# Patient Record
Sex: FEMALE | Race: WHITE | NOT HISPANIC OR LATINO | URBAN - METROPOLITAN AREA
[De-identification: names, ages, dates, MRNs, and addresses within clinical notes are randomized per-mention and may not be internally consistent; named-entity substitution may affect disease eponyms.]

---

## 2023-05-10 ENCOUNTER — INPATIENT (INPATIENT)
Facility: HOSPITAL | Age: 23
LOS: 1 days | Discharge: HOME | DRG: 560 | End: 2023-05-12
Attending: OBSTETRICS & GYNECOLOGY | Admitting: OBSTETRICS & GYNECOLOGY
Payer: MEDICAID

## 2023-05-10 VITALS
RESPIRATION RATE: 16 BRPM | SYSTOLIC BLOOD PRESSURE: 116 MMHG | DIASTOLIC BLOOD PRESSURE: 73 MMHG | TEMPERATURE: 98 F | HEART RATE: 82 BPM

## 2023-05-10 DIAGNOSIS — O26.893 OTHER SPECIFIED PREGNANCY RELATED CONDITIONS, THIRD TRIMESTER: ICD-10-CM

## 2023-05-10 LAB
ABO RH CONFIRMATION: SIGNIFICANT CHANGE UP
BASOPHILS # BLD AUTO: 0.06 K/UL — SIGNIFICANT CHANGE UP (ref 0–0.2)
BASOPHILS NFR BLD AUTO: 0.3 % — SIGNIFICANT CHANGE UP (ref 0–1)
BLD GP AB SCN SERPL QL: SIGNIFICANT CHANGE UP
EOSINOPHIL # BLD AUTO: 0.12 K/UL — SIGNIFICANT CHANGE UP (ref 0–0.7)
EOSINOPHIL NFR BLD AUTO: 0.7 % — SIGNIFICANT CHANGE UP (ref 0–8)
HBV SURFACE AG SERPL QL IA: SIGNIFICANT CHANGE UP
HCT VFR BLD CALC: 37.6 % — SIGNIFICANT CHANGE UP (ref 37–47)
HGB BLD-MCNC: 13.1 G/DL — SIGNIFICANT CHANGE UP (ref 12–16)
HIV 1 & 2 AB SERPL IA.RAPID: SIGNIFICANT CHANGE UP
IMM GRANULOCYTES NFR BLD AUTO: 0.6 % — HIGH (ref 0.1–0.3)
LYMPHOCYTES # BLD AUTO: 20.7 % — SIGNIFICANT CHANGE UP (ref 20.5–51.1)
LYMPHOCYTES # BLD AUTO: 3.68 K/UL — HIGH (ref 1.2–3.4)
MCHC RBC-ENTMCNC: 30.9 PG — SIGNIFICANT CHANGE UP (ref 27–31)
MCHC RBC-ENTMCNC: 34.8 G/DL — SIGNIFICANT CHANGE UP (ref 32–37)
MCV RBC AUTO: 88.7 FL — SIGNIFICANT CHANGE UP (ref 81–99)
MEV IGG SER-ACNC: >300 AU/ML — SIGNIFICANT CHANGE UP
MEV IGG+IGM SER-IMP: POSITIVE — SIGNIFICANT CHANGE UP
MONOCYTES # BLD AUTO: 1.17 K/UL — HIGH (ref 0.1–0.6)
MONOCYTES NFR BLD AUTO: 6.6 % — SIGNIFICANT CHANGE UP (ref 1.7–9.3)
MUV AB SER-ACNC: POSITIVE — SIGNIFICANT CHANGE UP
MUV IGG FLD-ACNC: 148 AU/ML — SIGNIFICANT CHANGE UP
NEUTROPHILS # BLD AUTO: 12.66 K/UL — HIGH (ref 1.4–6.5)
NEUTROPHILS NFR BLD AUTO: 71.1 % — SIGNIFICANT CHANGE UP (ref 42.2–75.2)
NRBC # BLD: 0 /100 WBCS — SIGNIFICANT CHANGE UP (ref 0–0)
PLATELET # BLD AUTO: 240 K/UL — SIGNIFICANT CHANGE UP (ref 130–400)
PMV BLD: 12.7 FL — HIGH (ref 7.4–10.4)
PRENATAL SYPHILIS TEST: SIGNIFICANT CHANGE UP
RBC # BLD: 4.24 M/UL — SIGNIFICANT CHANGE UP (ref 4.2–5.4)
RBC # FLD: 12.9 % — SIGNIFICANT CHANGE UP (ref 11.5–14.5)
RUBV IGG SER-ACNC: 1.5 INDEX — SIGNIFICANT CHANGE UP
RUBV IGG SER-IMP: POSITIVE — SIGNIFICANT CHANGE UP
WBC # BLD: 17.8 K/UL — HIGH (ref 4.8–10.8)
WBC # FLD AUTO: 17.8 K/UL — HIGH (ref 4.8–10.8)

## 2023-05-10 PROCEDURE — 86901 BLOOD TYPING SEROLOGIC RH(D): CPT

## 2023-05-10 PROCEDURE — 87340 HEPATITIS B SURFACE AG IA: CPT

## 2023-05-10 PROCEDURE — 99214 OFFICE O/P EST MOD 30 MIN: CPT

## 2023-05-10 PROCEDURE — 86762 RUBELLA ANTIBODY: CPT

## 2023-05-10 PROCEDURE — 86765 RUBEOLA ANTIBODY: CPT

## 2023-05-10 PROCEDURE — 59409 OBSTETRICAL CARE: CPT | Mod: U9

## 2023-05-10 PROCEDURE — 85025 COMPLETE CBC W/AUTO DIFF WBC: CPT

## 2023-05-10 PROCEDURE — 86592 SYPHILIS TEST NON-TREP QUAL: CPT

## 2023-05-10 PROCEDURE — 86703 HIV-1/HIV-2 1 RESULT ANTBDY: CPT

## 2023-05-10 PROCEDURE — 86850 RBC ANTIBODY SCREEN: CPT

## 2023-05-10 PROCEDURE — 36415 COLL VENOUS BLD VENIPUNCTURE: CPT

## 2023-05-10 PROCEDURE — 86735 MUMPS ANTIBODY: CPT

## 2023-05-10 PROCEDURE — 59050 FETAL MONITOR W/REPORT: CPT

## 2023-05-10 PROCEDURE — 86900 BLOOD TYPING SEROLOGIC ABO: CPT

## 2023-05-10 RX ORDER — SIMETHICONE 80 MG/1
80 TABLET, CHEWABLE ORAL EVERY 4 HOURS
Refills: 0 | Status: DISCONTINUED | OUTPATIENT
Start: 2023-05-10 | End: 2023-05-12

## 2023-05-10 RX ORDER — DIBUCAINE 1 %
1 OINTMENT (GRAM) RECTAL EVERY 6 HOURS
Refills: 0 | Status: DISCONTINUED | OUTPATIENT
Start: 2023-05-10 | End: 2023-05-12

## 2023-05-10 RX ORDER — MAGNESIUM HYDROXIDE 400 MG/1
30 TABLET, CHEWABLE ORAL
Refills: 0 | Status: DISCONTINUED | OUTPATIENT
Start: 2023-05-10 | End: 2023-05-12

## 2023-05-10 RX ORDER — DEXAMETHASONE 0.5 MG/5ML
4 ELIXIR ORAL EVERY 6 HOURS
Refills: 0 | Status: DISCONTINUED | OUTPATIENT
Start: 2023-05-10 | End: 2023-05-12

## 2023-05-10 RX ORDER — ONDANSETRON 8 MG/1
4 TABLET, FILM COATED ORAL EVERY 6 HOURS
Refills: 0 | Status: DISCONTINUED | OUTPATIENT
Start: 2023-05-10 | End: 2023-05-12

## 2023-05-10 RX ORDER — OXYCODONE HYDROCHLORIDE 5 MG/1
5 TABLET ORAL
Refills: 0 | Status: DISCONTINUED | OUTPATIENT
Start: 2023-05-10 | End: 2023-05-12

## 2023-05-10 RX ORDER — BENZOCAINE 10 %
1 GEL (GRAM) MUCOUS MEMBRANE EVERY 6 HOURS
Refills: 0 | Status: DISCONTINUED | OUTPATIENT
Start: 2023-05-10 | End: 2023-05-12

## 2023-05-10 RX ORDER — LANOLIN
1 OINTMENT (GRAM) TOPICAL EVERY 6 HOURS
Refills: 0 | Status: DISCONTINUED | OUTPATIENT
Start: 2023-05-10 | End: 2023-05-12

## 2023-05-10 RX ORDER — AER TRAVELER 0.5 G/1
1 SOLUTION RECTAL; TOPICAL EVERY 4 HOURS
Refills: 0 | Status: DISCONTINUED | OUTPATIENT
Start: 2023-05-10 | End: 2023-05-12

## 2023-05-10 RX ORDER — FENTANYL/BUPIVACAINE/NS/PF 2MCG/ML-.1
250 PLASTIC BAG, INJECTION (ML) INJECTION
Refills: 0 | Status: DISCONTINUED | OUTPATIENT
Start: 2023-05-10 | End: 2023-05-12

## 2023-05-10 RX ORDER — KETOROLAC TROMETHAMINE 30 MG/ML
30 SYRINGE (ML) INJECTION ONCE
Refills: 0 | Status: DISCONTINUED | OUTPATIENT
Start: 2023-05-10 | End: 2023-05-10

## 2023-05-10 RX ORDER — PRAMOXINE HYDROCHLORIDE 150 MG/15G
1 AEROSOL, FOAM RECTAL EVERY 4 HOURS
Refills: 0 | Status: DISCONTINUED | OUTPATIENT
Start: 2023-05-10 | End: 2023-05-12

## 2023-05-10 RX ORDER — SODIUM CHLORIDE 9 MG/ML
1000 INJECTION, SOLUTION INTRAVENOUS
Refills: 0 | Status: DISCONTINUED | OUTPATIENT
Start: 2023-05-10 | End: 2023-05-10

## 2023-05-10 RX ORDER — DIPHENHYDRAMINE HCL 50 MG
25 CAPSULE ORAL EVERY 6 HOURS
Refills: 0 | Status: DISCONTINUED | OUTPATIENT
Start: 2023-05-10 | End: 2023-05-12

## 2023-05-10 RX ORDER — NALOXONE HYDROCHLORIDE 4 MG/.1ML
0.1 SPRAY NASAL
Refills: 0 | Status: DISCONTINUED | OUTPATIENT
Start: 2023-05-10 | End: 2023-05-12

## 2023-05-10 RX ORDER — CITRIC ACID/SODIUM CITRATE 300-500 MG
15 SOLUTION, ORAL ORAL EVERY 6 HOURS
Refills: 0 | Status: DISCONTINUED | OUTPATIENT
Start: 2023-05-10 | End: 2023-05-10

## 2023-05-10 RX ORDER — OXYCODONE HYDROCHLORIDE 5 MG/1
5 TABLET ORAL ONCE
Refills: 0 | Status: DISCONTINUED | OUTPATIENT
Start: 2023-05-10 | End: 2023-05-12

## 2023-05-10 RX ORDER — TETANUS TOXOID, REDUCED DIPHTHERIA TOXOID AND ACELLULAR PERTUSSIS VACCINE, ADSORBED 5; 2.5; 8; 8; 2.5 [IU]/.5ML; [IU]/.5ML; UG/.5ML; UG/.5ML; UG/.5ML
0.5 SUSPENSION INTRAMUSCULAR ONCE
Refills: 0 | Status: DISCONTINUED | OUTPATIENT
Start: 2023-05-10 | End: 2023-05-12

## 2023-05-10 RX ORDER — HYDROCORTISONE 1 %
1 OINTMENT (GRAM) TOPICAL EVERY 6 HOURS
Refills: 0 | Status: DISCONTINUED | OUTPATIENT
Start: 2023-05-10 | End: 2023-05-12

## 2023-05-10 RX ORDER — OXYTOCIN 10 UNIT/ML
333.33 VIAL (ML) INJECTION
Qty: 20 | Refills: 0 | Status: DISCONTINUED | OUTPATIENT
Start: 2023-05-10 | End: 2023-05-12

## 2023-05-10 RX ORDER — SODIUM CHLORIDE 9 MG/ML
3 INJECTION INTRAMUSCULAR; INTRAVENOUS; SUBCUTANEOUS EVERY 8 HOURS
Refills: 0 | Status: DISCONTINUED | OUTPATIENT
Start: 2023-05-10 | End: 2023-05-12

## 2023-05-10 RX ORDER — CHLORHEXIDINE GLUCONATE 213 G/1000ML
1 SOLUTION TOPICAL DAILY
Refills: 0 | Status: DISCONTINUED | OUTPATIENT
Start: 2023-05-10 | End: 2023-05-10

## 2023-05-10 RX ORDER — ACETAMINOPHEN 500 MG
975 TABLET ORAL
Refills: 0 | Status: DISCONTINUED | OUTPATIENT
Start: 2023-05-10 | End: 2023-05-12

## 2023-05-10 RX ORDER — IBUPROFEN 200 MG
600 TABLET ORAL EVERY 6 HOURS
Refills: 0 | Status: COMPLETED | OUTPATIENT
Start: 2023-05-10 | End: 2024-04-07

## 2023-05-10 RX ORDER — IBUPROFEN 200 MG
600 TABLET ORAL EVERY 6 HOURS
Refills: 0 | Status: DISCONTINUED | OUTPATIENT
Start: 2023-05-10 | End: 2023-05-12

## 2023-05-10 RX ADMIN — Medication 1 TABLET(S): at 11:42

## 2023-05-10 RX ADMIN — SODIUM CHLORIDE 3 MILLILITER(S): 9 INJECTION INTRAMUSCULAR; INTRAVENOUS; SUBCUTANEOUS at 16:00

## 2023-05-10 RX ADMIN — Medication 975 MILLIGRAM(S): at 12:15

## 2023-05-10 RX ADMIN — Medication 975 MILLIGRAM(S): at 18:25

## 2023-05-10 RX ADMIN — Medication 975 MILLIGRAM(S): at 19:00

## 2023-05-10 RX ADMIN — Medication 1000 MILLIUNIT(S)/MIN: at 08:13

## 2023-05-10 RX ADMIN — Medication 600 MILLIGRAM(S): at 15:23

## 2023-05-10 RX ADMIN — Medication 975 MILLIGRAM(S): at 11:42

## 2023-05-10 RX ADMIN — Medication 600 MILLIGRAM(S): at 16:00

## 2023-05-10 RX ADMIN — SODIUM CHLORIDE 3 MILLILITER(S): 9 INJECTION INTRAMUSCULAR; INTRAVENOUS; SUBCUTANEOUS at 21:37

## 2023-05-10 NOTE — OB PROVIDER H&P - NSLOWPPHRISK_OBGYN_A_OB
No previous uterine incision/Chery Pregnancy/Less than or equal to 4 previous vaginal births/No known bleeding disorder/No history of postpartum hemorrhage/No other PPH risks indicated

## 2023-05-10 NOTE — OB PROVIDER H&P - ASSESSMENT
23y , @40w2d, GBS neg, SROM since  @1100     -admit to labor and delivery  -pain management prn   -continous efm & toco  -admission labs  -IV access   -IV hydration   -diet: clear liquid diet     Dr. Gibson and Dr. Gutierrez aware.  23y , @40w2d, GBS neg, SROM since  @1100     -admit to labor and delivery  -pain management prn   -continous efm & toco  -admission labs  -IV access   -IV hydration   -diet: clear liquid diet     d/w Dr Gutierrez

## 2023-05-10 NOTE — OB PROVIDER DELIVERY SUMMARY - NSNUMBEROFNEWBORNS_OBGYN_ALL_OB_NU
Hub to read    Patient needs to be established with new provider before any medications are refilled. [facility] new office number is [phone]   1

## 2023-05-10 NOTE — OB RN DELIVERY SUMMARY - NSBEGANLABOR_OBGYN_A_OB
Prior to Admission Repair Performed By Another Provider Text (Leave Blank If You Do Not Want): After the tissue was excised the defect was repaired by another provider.

## 2023-05-10 NOTE — OB RN PATIENT PROFILE - WEIGHT METHOD
Prior to the start of the procedure and with procedural staff participation, I verbally confirmed the patient s identity using two indicators, relevant allergies, that the procedure was appropriate and matched the consent or emergent situation, and that the correct equipment/implants were available. Immediately prior to starting the procedure I conducted the Time Out with the procedural staff and re-confirmed the patient s name, procedure, and site/side. (The Joint Commission universal protocol was followed.)  Yes    Sedation (Moderate or Deep): None  Pt has signed the consent form stating that we will be doing a CYSTOSCOPY (with or without stent removal) today, and that it is the correct procedure. I verbally confirmed the patient s identity using two indicators, relevant allergies, and that the correct equipment was available. Post-op information given to the pt as needed at check-out. I have sent an appropriate antibiotic to the pharmacy in our building as recommended by the MD. ALISE Collazo CMA    
stated

## 2023-05-10 NOTE — OB RN DELIVERY SUMMARY - NSSELHIDDEN_OBGYN_ALL_OB_FT
[NS_DeliveryAttending1_OBGYN_ALL_OB_FT:[id number]],[NS_DeliveryRN_OBGYN_ALL_OB_FT:[id number]],[NS_CirculateRN2_OBGYN_ALL_OB_FT:[id number]=]

## 2023-05-10 NOTE — PRE-ANESTHESIA EVALUATION ADULT - RESPIRATORY RATE (BREATHS/MIN)
16
You can access the FollowMyHealth Patient Portal offered by [facility] by registering at the following website: [email or web address]. By joining [email or web address]’s FollowMyHealth portal, you will also be able to view your health information using other applications (apps) compatible with our system.

## 2023-05-10 NOTE — OB RN PATIENT PROFILE - DATE/TIME OF ACCEPTANCE
Patient presents for Zarxio injection today.     I am an RN. Does the patient have an active anti-cancer treatment plan? (oral, injection, or IV)   Yes.   Regimen: Taxotere/Cytoxan  Cycle/Day: cycle 2/day 4      ECOG:   ECOG [06/06/22 0267]   ECOG Performance Status 1       Oral Chemotherapy: No  Nursing Assessment:  A comprehensive nursing assessment was performed and the patient reports the following:    Anxiety/Depression/Insomnia: Anxiety: No  Pain: Generalized body aches from Zarxio. She has been taking Claritin and Tylenol with improvement.        Toxicity Assessment     Auditory/Ear  Assessment: Not Reviewed  Blood/Bone Marrow  Assessment: Not Reviewed  Cardiac General  Assessment: Yes (Within Defined Limits)  Constitutional  Assessment: Yes (Within Defined Limits)  Fatigue: Fatigue relieved by rest  Dermatology/Skin  Assessment: Yes (Within Defined Limits)  Endocrine  Assessment: Not Reviewed  Gastrointestinal  Assessment: Yes (Within Defined Limits)  Hemorrhage/Bleeding  Assessment: Yes (Within Defined Limits)  Infection  Assessment: Yes (Within Defined Limits)  Lymphatics  Assessment: Yes (Within Defined Limits)  Metabolic/Laboratory  Assessment: Not Reviewed  Musculoskeletal  Assessment: Yes (Within Defined Limits)  Generalized Muscle Weakness: Symptomatic, weakness perceived by patient but not evident on physical exam  Neurology  Assessment: Yes (Within Defined Limits)  Ocular  Assessment: Not Reviewed  Pain  Assessment: Yes (Within Defined Limits)  Pain: Mild pain  Pulmonary/Upper Respiratory  Assessment: Yes (Within Defined Limits)  Genitourinary  Assessment: Not Reviewed  Sexual/Reproductive  Assessment: Not Reviewed     Patient has valid pre-authorization, VS completed and Patient instructed to call the office with any questions or concerns    Pre-Injection Information: Allergies reviewed as required for injection type., Pt states feeling well, no complaints. and Pt denies signs and symptoms of  infection.    Refer to MAR (medication administration record) for type of injection and medication given.  Needle Size: kit  Patient tolerated well: Stable     Dr. Suarez is supervising clinician today.   10-May-2023 05:50

## 2023-05-10 NOTE — OB PROVIDER H&P - ATTENDING COMMENTS
23y , @40w2d, GBS neg, SROM since  @1100. regular ctx. Admit for labor/SROM and management   vtx, lepolds ~3300g, labs normal.  Maternal and fetal status reassuring   Labor management discussed with patient and may include augmentation with mechanical and medical options. IV insertion needed for resuscitative measures. Pain management by anesthesia available as needed. Possibility of  delivery in event of persistent nonreassuring fetal status remote from delivery discussed. Risk of hemorrhage and infection also discussed. All questions answered and patient endorsed understanding.

## 2023-05-10 NOTE — OB PROVIDER H&P - NSHPPHYSICALEXAM_GEN_ALL_CORE
Vital Signs Last 24 Hrs  T(C): 36.4 (10 May 2023 06:02), Max: 36.4 (10 May 2023 05:48)  T(F): 97.5 (10 May 2023 06:02), Max: 97.5 (10 May 2023 05:48)  HR: 82 (10 May 2023 06:02) (82 - 82)  BP: 116/73 (10 May 2023 06:02) (116/73 - 116/73)  RR: 16 (10 May 2023 06:02) (16 - 16)    Gen: AOx3, no acute distress  CVS: RRR  Lungs: CTABL  Abd: soft, gravid, non tender, mildly palpable contractions  Ext: No edema bilaterally    Speculum: Pooling amniotic fluid admix with slight blood, Nitrazine pos, ferning pos     EFM:  125/mod/+accels; cat 1  [place]: q2-3  SVE: 4/50/-2   vertex ruptured @0600

## 2023-05-10 NOTE — PROCEDURE NOTE - ADDITIONAL PROCEDURE DETAILS
0645 Called to patient room for epidural. Procedure reviewed, risks/benefits/alternatives discussed and consent obtained.  CINDY 4.5cm, 27Ga used to obtain CSF, 1.2ml Bupivacaine 0.25% administered with good effect  Catheter threaded easily to 11cm, negative aspiration, negative test dose  Patient placed on PIEB/PCEA instructed to request anesthesia with any questions/concerns  VSS/FHR WNL

## 2023-05-10 NOTE — OB PROVIDER H&P - HISTORY OF PRESENT ILLNESS
23y , @40w2d, BRIDGET 23 by LMP c/w first trimester sono, presenting for leakage of fluid and contractions. Reports leakage of fluid since yesterday,  @1100, admix with slight pink discharge. Contractions through the day yesterday and this morning, now occurring every 5 minutes.  Endorses good fetal movement. Denies complications this pregnancy. GBS neg.      Patient received prenatal care at [facility] in the [place], recently moved to [place].

## 2023-05-10 NOTE — OB PROVIDER DELIVERY SUMMARY - NSPROVIDERDELIVERYNOTE_OBGYN_ALL_OB_FT
Patient was fully dilated and pushing. Head delivered in OA and restituted to ROT. Unable to reduce nuchal cord. Anterior shoulder delivered followed by posterior shoulder atraumatically, then rest of the body. Nuchal cord reduced. The baby was suctioned and placed on mother's abdomen. Delayed cord clamping performed. Cord clamped and cut. Cord gases obtained. Placenta was spontaneously delivered intact. Fundus was massaged and firm. Good hemostasis was noted. Cervix and vagina were inspected, no lacerations appreciated, good hemostasis. Viable male delivered APGARS 9/9 weighing [id number].     Dr. Schulz and Dr. Gutierrez present for delivery.

## 2023-05-10 NOTE — OB PROVIDER DELIVERY SUMMARY - NSSELHIDDEN_OBGYN_ALL_OB_FT
[NS_DeliveryAttending1_OBGYN_ALL_OB_FT:[id number]],[NS_DeliveryRN_OBGYN_ALL_OB_FT:[id number]],[NS_CirculateRN2_OBGYN_ALL_OB_FT:[id number]=]

## 2023-05-11 ENCOUNTER — TRANSCRIPTION ENCOUNTER (OUTPATIENT)
Age: 23
End: 2023-05-11

## 2023-05-11 LAB
BASOPHILS # BLD AUTO: 0.05 K/UL — SIGNIFICANT CHANGE UP (ref 0–0.2)
BASOPHILS # BLD AUTO: 0.05 K/UL — SIGNIFICANT CHANGE UP (ref 0–0.2)
BASOPHILS NFR BLD AUTO: 0.2 % — SIGNIFICANT CHANGE UP (ref 0–1)
BASOPHILS NFR BLD AUTO: 0.3 % — SIGNIFICANT CHANGE UP (ref 0–1)
EOSINOPHIL # BLD AUTO: 0.13 K/UL — SIGNIFICANT CHANGE UP (ref 0–0.7)
EOSINOPHIL # BLD AUTO: 0.17 K/UL — SIGNIFICANT CHANGE UP (ref 0–0.7)
EOSINOPHIL NFR BLD AUTO: 0.6 % — SIGNIFICANT CHANGE UP (ref 0–8)
EOSINOPHIL NFR BLD AUTO: 1.1 % — SIGNIFICANT CHANGE UP (ref 0–8)
HCT VFR BLD CALC: 33.2 % — LOW (ref 37–47)
HCT VFR BLD CALC: 35.3 % — LOW (ref 37–47)
HGB BLD-MCNC: 11.4 G/DL — LOW (ref 12–16)
HGB BLD-MCNC: 11.9 G/DL — LOW (ref 12–16)
IMM GRANULOCYTES NFR BLD AUTO: 0.6 % — HIGH (ref 0.1–0.3)
IMM GRANULOCYTES NFR BLD AUTO: 0.6 % — HIGH (ref 0.1–0.3)
LYMPHOCYTES # BLD AUTO: 1.73 K/UL — SIGNIFICANT CHANGE UP (ref 1.2–3.4)
LYMPHOCYTES # BLD AUTO: 10.8 % — LOW (ref 20.5–51.1)
LYMPHOCYTES # BLD AUTO: 2 K/UL — SIGNIFICANT CHANGE UP (ref 1.2–3.4)
LYMPHOCYTES # BLD AUTO: 9.6 % — LOW (ref 20.5–51.1)
MCHC RBC-ENTMCNC: 30.4 PG — SIGNIFICANT CHANGE UP (ref 27–31)
MCHC RBC-ENTMCNC: 30.9 PG — SIGNIFICANT CHANGE UP (ref 27–31)
MCHC RBC-ENTMCNC: 33.7 G/DL — SIGNIFICANT CHANGE UP (ref 32–37)
MCHC RBC-ENTMCNC: 34.3 G/DL — SIGNIFICANT CHANGE UP (ref 32–37)
MCV RBC AUTO: 90 FL — SIGNIFICANT CHANGE UP (ref 81–99)
MCV RBC AUTO: 90.3 FL — SIGNIFICANT CHANGE UP (ref 81–99)
MONOCYTES # BLD AUTO: 0.75 K/UL — HIGH (ref 0.1–0.6)
MONOCYTES # BLD AUTO: 0.95 K/UL — HIGH (ref 0.1–0.6)
MONOCYTES NFR BLD AUTO: 4.5 % — SIGNIFICANT CHANGE UP (ref 1.7–9.3)
MONOCYTES NFR BLD AUTO: 4.7 % — SIGNIFICANT CHANGE UP (ref 1.7–9.3)
NEUTROPHILS # BLD AUTO: 13.26 K/UL — HIGH (ref 1.4–6.5)
NEUTROPHILS # BLD AUTO: 17.64 K/UL — HIGH (ref 1.4–6.5)
NEUTROPHILS NFR BLD AUTO: 82.5 % — HIGH (ref 42.2–75.2)
NEUTROPHILS NFR BLD AUTO: 84.5 % — HIGH (ref 42.2–75.2)
NRBC # BLD: 0 /100 WBCS — SIGNIFICANT CHANGE UP (ref 0–0)
NRBC # BLD: 0 /100 WBCS — SIGNIFICANT CHANGE UP (ref 0–0)
PLATELET # BLD AUTO: 203 K/UL — SIGNIFICANT CHANGE UP (ref 130–400)
PLATELET # BLD AUTO: 226 K/UL — SIGNIFICANT CHANGE UP (ref 130–400)
PMV BLD: 12.7 FL — HIGH (ref 7.4–10.4)
PMV BLD: 12.8 FL — HIGH (ref 7.4–10.4)
RBC # BLD: 3.69 M/UL — LOW (ref 4.2–5.4)
RBC # BLD: 3.91 M/UL — LOW (ref 4.2–5.4)
RBC # FLD: 13.1 % — SIGNIFICANT CHANGE UP (ref 11.5–14.5)
RBC # FLD: 13.2 % — SIGNIFICANT CHANGE UP (ref 11.5–14.5)
WBC # BLD: 16.05 K/UL — HIGH (ref 4.8–10.8)
WBC # BLD: 20.89 K/UL — HIGH (ref 4.8–10.8)
WBC # FLD AUTO: 16.05 K/UL — HIGH (ref 4.8–10.8)
WBC # FLD AUTO: 20.89 K/UL — HIGH (ref 4.8–10.8)

## 2023-05-11 PROCEDURE — 99231 SBSQ HOSP IP/OBS SF/LOW 25: CPT

## 2023-05-11 RX ORDER — ACETAMINOPHEN 500 MG
3 TABLET ORAL
Qty: 0 | Refills: 0 | DISCHARGE
Start: 2023-05-11

## 2023-05-11 RX ORDER — SIMETHICONE 80 MG/1
1 TABLET, CHEWABLE ORAL
Qty: 0 | Refills: 0 | DISCHARGE
Start: 2023-05-11

## 2023-05-11 RX ORDER — IBUPROFEN 200 MG
1 TABLET ORAL
Qty: 0 | Refills: 0 | DISCHARGE
Start: 2023-05-11

## 2023-05-11 RX ORDER — MAGNESIUM HYDROXIDE 400 MG/1
30 TABLET, CHEWABLE ORAL
Qty: 0 | Refills: 0 | DISCHARGE
Start: 2023-05-11

## 2023-05-11 RX ORDER — BENZOCAINE 10 %
1 GEL (GRAM) MUCOUS MEMBRANE
Qty: 0 | Refills: 0 | DISCHARGE
Start: 2023-05-11

## 2023-05-11 RX ADMIN — Medication 600 MILLIGRAM(S): at 21:38

## 2023-05-11 RX ADMIN — Medication 975 MILLIGRAM(S): at 12:03

## 2023-05-11 RX ADMIN — Medication 1 TABLET(S): at 12:03

## 2023-05-11 RX ADMIN — Medication 975 MILLIGRAM(S): at 12:33

## 2023-05-11 RX ADMIN — SODIUM CHLORIDE 3 MILLILITER(S): 9 INJECTION INTRAMUSCULAR; INTRAVENOUS; SUBCUTANEOUS at 14:00

## 2023-05-11 RX ADMIN — Medication 600 MILLIGRAM(S): at 10:20

## 2023-05-11 RX ADMIN — Medication 975 MILLIGRAM(S): at 06:19

## 2023-05-11 RX ADMIN — SODIUM CHLORIDE 3 MILLILITER(S): 9 INJECTION INTRAMUSCULAR; INTRAVENOUS; SUBCUTANEOUS at 22:00

## 2023-05-11 RX ADMIN — Medication 600 MILLIGRAM(S): at 09:41

## 2023-05-11 RX ADMIN — SODIUM CHLORIDE 3 MILLILITER(S): 9 INJECTION INTRAMUSCULAR; INTRAVENOUS; SUBCUTANEOUS at 06:15

## 2023-05-11 RX ADMIN — Medication 975 MILLIGRAM(S): at 06:49

## 2023-05-11 NOTE — DISCHARGE NOTE OB - MEDICATION SUMMARY - MEDICATIONS TO TAKE
I will START or STAY ON the medications listed below when I get home from the hospital:    acetaminophen 325 mg oral tablet  -- 3 tab(s) by mouth every 6 hours  -- Indication: For pain    ibuprofen 600 mg oral tablet  -- 1 tab(s) by mouth every 6 hours  -- Indication: For pain    acetaminophen 325 mg oral tablet  -- 3 tab(s) by mouth every 6 hours  -- Indication: For pain    magnesium hydroxide 8% oral suspension  -- 30 milliliter(s) by mouth 2 times a day As needed Constipation  -- Indication: For constipation    benzocaine 20% topical spray  -- 1 Apply on skin to affected area every 6 hours As needed for Perineal discomfort  -- Indication: For perineal pain    Prenatal Multivitamins with Folic Acid 1 mg oral tablet  -- 1 tab(s) by mouth once a day  -- Indication: For vitamin    simethicone 80 mg oral tablet, chewable  -- 1 tab(s) by mouth every 4 hours As needed Gas  -- Indication: For gas pain   I will START or STAY ON the medications listed below when I get home from the hospital:    ibuprofen 600 mg oral tablet  -- 1 tab(s) by mouth every 6 hours  -- Indication: For pain    acetaminophen 325 mg oral tablet  -- 3 tab(s) by mouth every 6 hours  -- Indication: For pain    magnesium hydroxide 8% oral suspension  -- 30 milliliter(s) by mouth 2 times a day As needed Constipation  -- Indication: For constipation    benzocaine 20% topical spray  -- 1 Apply on skin to affected area every 6 hours As needed for Perineal discomfort  -- Indication: For perineal pain    Prenatal Multivitamins with Folic Acid 1 mg oral tablet  -- 1 tab(s) by mouth once a day  -- Indication: For vitamin    simethicone 80 mg oral tablet, chewable  -- 1 tab(s) by mouth every 4 hours As needed Gas  -- Indication: For gas pain

## 2023-05-11 NOTE — DISCHARGE NOTE OB - MATERIALS PROVIDED
Vaccinations/[facility]  Screening Program/  Immunization Record/Breastfeeding Log/Breastfeeding Mother’s Support Group Information/Guide to Postpartum Care/[facility] Hearing Screen Program/Back To Sleep Handout/Breastfeeding Guide and Packet/Birth Certificate Instructions/Discharge Medication Information for Patients and Families Pocket Guide/MMR Vaccination (VIS Pub Date: 2012)/Tdap Vaccination (VIS Pub Date: 2012)

## 2023-05-11 NOTE — DISCHARGE NOTE OB - PATIENT PORTAL LINK FT
You can access the FollowMyHealth Patient Portal offered by [facility] by registering at the following website: [email or web address]. By joining [employer]’s FollowMyHealth portal, you will also be able to view your health information using other applications (apps) compatible with our system.

## 2023-05-11 NOTE — DISCHARGE NOTE OB - CARE PROVIDER_API CALL
Viki Coe)  OBN  [address] [place] [address]  Phone: [phone]  Fax: [phone]  Follow Up Time:

## 2023-05-11 NOTE — PROGRESS NOTE ADULT - ASSESSMENT
A/P: 22yo now P1 S/P  PPD1, recovering well     - discussed r/b/a of male circumcision including bleeding, infection, and need for revision; mother demonstrated understand of the r/b/a and informed consent was obtained witnessed by the nurse   - encourage ambulation, PO hydration  - monitor vitals, bleeding  - f/u AM CBC   - routine postpartum course  - anticipate d/c home if CBC stable    Dr. Ashford and OB attending to be aware

## 2023-05-11 NOTE — PROGRESS NOTE ADULT - SUBJECTIVE AND OBJECTIVE BOX
MAT GUZMANA  23y  Female  [facility]  #[id number]    PGY1 Note:  Pt is a 22yo PPD#1. Pt is recovering well, no acute complaints. Pt states her pain is well controlled. Pt denies fever, chills, nausea, vomiting, SOB, chest pain, extremity swelling or pain. She is ambulating, voiding, passing flatus, tolerating PO. Desires vaginal ring for contraception.    MEDICATIONS  (STANDING):  acetaminophen     Tablet .. 975 milliGRAM(s) Oral <User Schedule>  ibuprofen  Tablet. 600 milliGRAM(s) Oral every 6 hours  prenatal multivitamin 1 Tablet(s) Oral daily    MEDICATIONS  (PRN):  benzocaine 20%/menthol 0.5% Spray 1 Spray(s) Topical every 6 hours PRN for Perineal discomfort  dexAMETHasone  Injectable 4 milliGRAM(s) IV Push every 6 hours PRN Nausea  dibucaine 1% Ointment 1 Application(s) Topical every 6 hours PRN Perineal discomfort  diphenhydrAMINE 25 milliGRAM(s) Oral every 6 hours PRN Pruritus  hydrocortisone 1% Cream 1 Application(s) Topical every 6 hours PRN Moderate Pain (4-6)  lanolin Ointment 1 Application(s) Topical every 6 hours PRN nipple soreness  magnesium hydroxide Suspension 30 milliLiter(s) Oral two times a day PRN Constipation  ondansetron Injectable 4 milliGRAM(s) IV Push every 6 hours PRN Nausea  oxyCODONE    IR 5 milliGRAM(s) Oral every 3 hours PRN Moderate to Severe Pain (4-10)  oxyCODONE    IR 5 milliGRAM(s) Oral once PRN Moderate to Severe Pain (4-10)  pramoxine 1%/zinc 5% Cream 1 Application(s) Topical every 4 hours PRN Moderate Pain (4-6)  simethicone 80 milliGRAM(s) Chew every 4 hours PRN Gas  witch hazel Pads 1 Application(s) Topical every 4 hours PRN Perineal discomfort    PAST MEDICAL & SURGICAL HISTORY:  No pertinent past medical history  No significant past surgical history    Physical Exam  Vital Signs Last 24 Hrs  T(C): 36.6 (10 May 2023 23:34), Max: 38.2 (10 May 2023 11:27)  T(F): 97.9 (10 May 2023 23:34), Max: 100.8 (10 May 2023 11:27)  HR: 76 (10 May 2023 23:34) (59 - 117)  BP: 108/63 (10 May 2023 23:34) (106/71 - 140/83)  RR: 18 (10 May 2023 23:34) (16 - 18)  SpO2: 100% (10 May 2023 08:41) (84% - 100%)    Gen: AAOx3, NAD  Fundus: firm, below umbilicus   Abd: Soft, nontender, nondistended, BS+  Lochia: minimal  Ext: No calf tenderness, no swelling    Labs:                        13.1   17.80 )-----------( 240      ( 10 May 2023 06:40 )             37.6

## 2023-05-11 NOTE — DISCHARGE NOTE OB - NS MD DC FALL RISK RISK
For information on Fall & Injury Prevention, visit: [email or web address] OR  [email or web address] OR  https://www.cdc.gov/steadi/patient.html

## 2023-05-12 VITALS
DIASTOLIC BLOOD PRESSURE: 68 MMHG | HEART RATE: 72 BPM | RESPIRATION RATE: 18 BRPM | SYSTOLIC BLOOD PRESSURE: 111 MMHG | TEMPERATURE: 98 F

## 2023-05-12 RX ADMIN — SODIUM CHLORIDE 3 MILLILITER(S): 9 INJECTION INTRAMUSCULAR; INTRAVENOUS; SUBCUTANEOUS at 06:11

## 2023-05-12 NOTE — PROGRESS NOTE ADULT - ASSESSMENT
A/P: 23y now P2, s/p  ppd2, recovering well   - pain management with PO pain meds   - monitor vitals/bleeding   - ambulation/PO hydration  - regular diet as tolerated   - routine postpartum care; discharge today    Dr. Ashford and Ob attending to be made aware.    A/P: 23y now P2, s/p  ppd2, recovering well   - pain management with PO pain meds   - monitor vitals/bleeding   - ambulation/PO hydration  - regular diet as tolerated   - routine postpartum care; discharge today

## 2023-05-12 NOTE — PROGRESS NOTE ADULT - SUBJECTIVE AND OBJECTIVE BOX
PGY1 Note: Vaginal Delivery    Pt seen and evaluated at bedside. Pain well controlled. Vaginal bleeding minimal. Denies dizziness/lightheadedness/CP/SOB/palpitations. Denies fever, chills, nausea/vomiting, diarrhea, dysuria, LE pain.     Ambulating: Yes  Voiding: Yes  Breast or bottle feeding: breast  Diet: tolerating regular diet    Physical Exam  Vital Signs Last 24 Hrs  T(C): 36.8 (11 May 2023 23:37), Max: 36.8 (11 May 2023 23:37)  T(F): 98.3 (11 May 2023 23:37), Max: 98.3 (11 May 2023 23:37)  HR: 55 (11 May 2023 23:37) (55 - 67)  BP: 100/60 (11 May 2023 23:37) (100/60 - 110/60)  RR: 18 (11 May 2023 23:37) (18 - 18)    Gen: AAOx3, NAD  Fundus: firm, below umbilicus   Abd: Soft, nontender, nondistended  Lochia: minimal   Ext: No calf tenderness, no swelling    Labs:                        11.9   16.05 )-----------( 226      ( 11 May 2023 21:06 )             35.3                         11.4   20.89 )-----------( 203      ( 11 May 2023 07:56 )             33.2        MEDICATIONS  (STANDING):  acetaminophen     Tablet .. 975 milliGRAM(s) Oral <User Schedule>  diphtheria/tetanus/pertussis (acellular) Vaccine (Adacel) 0.5 milliLiter(s) IntraMuscular once  fentanyl (2 MICROgram(s)/mL) + bupivacaine 0.0625%  in 0.9% Sodium Chloride PCEA 250 milliLiter(s) Epidural PCA Continuous  ibuprofen  Tablet. 600 milliGRAM(s) Oral every 6 hours  oxytocin Infusion 333.333 milliUNIT(s)/Min (1000 mL/Hr) IV Continuous <Continuous>  prenatal multivitamin 1 Tablet(s) Oral daily  sodium chloride 0.9% lock flush 3 milliLiter(s) IV Push every 8 hours    MEDICATIONS  (PRN):  benzocaine 20%/menthol 0.5% Spray 1 Spray(s) Topical every 6 hours PRN for Perineal discomfort  dexAMETHasone  Injectable 4 milliGRAM(s) IV Push every 6 hours PRN Nausea  dibucaine 1% Ointment 1 Application(s) Topical every 6 hours PRN Perineal discomfort  diphenhydrAMINE 25 milliGRAM(s) Oral every 6 hours PRN Pruritus  hydrocortisone 1% Cream 1 Application(s) Topical every 6 hours PRN Moderate Pain (4-6)  lanolin Ointment 1 Application(s) Topical every 6 hours PRN nipple soreness  magnesium hydroxide Suspension 30 milliLiter(s) Oral two times a day PRN Constipation  naloxone Injectable 0.1 milliGRAM(s) IV Push every 3 minutes PRN For ANY of the following changes in patient status:  A. RR LESS THAN 10 breaths per minute, B. Oxygen saturation LESS THAN 90%, C. Sedation score of 6  ondansetron Injectable 4 milliGRAM(s) IV Push every 6 hours PRN Nausea  oxyCODONE    IR 5 milliGRAM(s) Oral every 3 hours PRN Moderate to Severe Pain (4-10)  oxyCODONE    IR 5 milliGRAM(s) Oral once PRN Moderate to Severe Pain (4-10)  pramoxine 1%/zinc 5% Cream 1 Application(s) Topical every 4 hours PRN Moderate Pain (4-6)  simethicone 80 milliGRAM(s) Chew every 4 hours PRN Gas  witch hazel Pads 1 Application(s) Topical every 4 hours PRN Perineal discomfort     PGY1 Note: Vaginal Delivery    Pt seen and evaluated at bedside. Pain well controlled. Vaginal bleeding minimal. Denies dizziness/lightheadedness/CP/SOB/palpitations. Denies fever, chills, nausea/vomiting, diarrhea, dysuria, LE pain.     Ambulating: Yes  Voiding: Yes  Breast or bottle feeding: breast  Diet: tolerating regular diet    Physical Exam  Vital Signs Last 24 Hrs  T(C): 36.8 (11 May 2023 23:37), Max: 36.8 (11 May 2023 23:37)  T(F): 98.3 (11 May 2023 23:37), Max: 98.3 (11 May 2023 23:37)  HR: 55 (11 May 2023 23:37) (55 - 67)  BP: 100/60 (11 May 2023 23:37) (100/60 - 110/60)  RR: 18 (11 May 2023 23:37) (18 - 18)    Gen: AAOx3, NAD  Fundus: firm, below umbilicus   Abd: Soft, nontender, nondistended  Lochia: minimal   Ext: No calf tenderness, no swelling    Labs:                        11.9   16.05 )-----------( 226      ( 11 May 2023 21:06 )             35.3                         11.4   20.89 )-----------( 203      ( 11 May 2023 07:56 )             33.2

## 2023-05-12 NOTE — PROGRESS NOTE ADULT - ATTENDING COMMENTS
ppd#1  doing well  cbc-wbc 20  will repeat and monitor prior to discharge
PPD2 s/p  meeting milestones with labs and VS stable for discharge.   Return precautions given.   DC documentation provided

## 2023-05-16 DIAGNOSIS — O48.0 POST-TERM PREGNANCY: ICD-10-CM

## 2023-05-16 DIAGNOSIS — Z28.21 IMMUNIZATION NOT CARRIED OUT BECAUSE OF PATIENT REFUSAL: ICD-10-CM

## 2023-05-16 DIAGNOSIS — Z28.311 PARTIALLY VACCINATED FOR COVID-19: ICD-10-CM

## 2023-05-16 DIAGNOSIS — Z3A.40 40 WEEKS GESTATION OF PREGNANCY: ICD-10-CM

## 2023-05-16 DIAGNOSIS — Z28.09 IMMUNIZATION NOT CARRIED OUT BECAUSE OF OTHER CONTRAINDICATION: ICD-10-CM

## 2023-05-16 DIAGNOSIS — Z86.16 PERSONAL HISTORY OF COVID-19: ICD-10-CM

## 2023-08-07 PROBLEM — Z78.9 OTHER SPECIFIED HEALTH STATUS: Chronic | Status: ACTIVE | Noted: 2023-05-10

## 2023-09-08 ENCOUNTER — OUTPATIENT (OUTPATIENT)
Dept: OUTPATIENT SERVICES | Facility: HOSPITAL | Age: 23
LOS: 1 days | End: 2023-09-08
Payer: MEDICAID

## 2023-09-08 ENCOUNTER — APPOINTMENT (OUTPATIENT)
Dept: OBGYN | Facility: CLINIC | Age: 23
End: 2023-09-08
Payer: MEDICAID

## 2023-09-08 VITALS
BODY MASS INDEX: 22.43 KG/M2 | WEIGHT: 148 LBS | SYSTOLIC BLOOD PRESSURE: 103 MMHG | HEIGHT: 68 IN | DIASTOLIC BLOOD PRESSURE: 63 MMHG

## 2023-09-08 DIAGNOSIS — Z78.9 OTHER SPECIFIED HEALTH STATUS: ICD-10-CM

## 2023-09-08 DIAGNOSIS — Z01.419 ENCOUNTER FOR GYNECOLOGICAL EXAMINATION (GENERAL) (ROUTINE) W/OUT ABNORMAL FINDINGS: ICD-10-CM

## 2023-09-08 DIAGNOSIS — Z01.419 ENCOUNTER FOR GYNECOLOGICAL EXAMINATION (GENERAL) (ROUTINE) WITHOUT ABNORMAL FINDINGS: ICD-10-CM

## 2023-09-08 PROBLEM — Z00.00 ENCOUNTER FOR PREVENTIVE HEALTH EXAMINATION: Status: ACTIVE | Noted: 2023-09-08

## 2023-09-08 PROCEDURE — 87591 N.GONORRHOEAE DNA AMP PROB: CPT

## 2023-09-08 PROCEDURE — 99395 PREV VISIT EST AGE 18-39: CPT

## 2023-09-08 PROCEDURE — 88142 CYTOPATH C/V THIN LAYER: CPT

## 2023-09-08 PROCEDURE — 87491 CHLMYD TRACH DNA AMP PROBE: CPT

## 2023-09-08 NOTE — DISCUSSION/SUMMARY
[FreeTextEntry1] : 22 y/o P2 with normal annual - Pap smear collected - [facility] collected - Discussed contraceptive methods with patient. Patient desires LARC. IUD vs Nexplanon discussed. patient desires Nexplanon.  - Follow up 1-2 weeks for Nexplanon insertion, and 1 year for annual.

## 2023-09-08 NOTE — PHYSICAL EXAM
[Appropriately responsive] : appropriately responsive [Alert] : alert [No Acute Distress] : no acute distress [Soft] : soft [Non-tender] : non-tender [Non-distended] : non-distended [Examination Of The Breasts] : a normal appearance [No Masses] : no breast masses were palpable [Normal] : normal [FreeTextEntry6] : 7 week size, anteverted uterus, no adnexal tenderness

## 2023-09-08 NOTE — HISTORY OF PRESENT ILLNESS
[N] : Patient reports normal menses [Y] : Positive pregnancy history [LMPDate] : 9/5/23 [PGHxTotal] : 2 [[facility] : 2 [PGHxPremature] : 0 [PGHxAbortions] : 0 [[facility] : 2 [FreeTextEntry1] :  x2

## 2023-09-11 ENCOUNTER — OUTPATIENT (OUTPATIENT)
Dept: OUTPATIENT SERVICES | Facility: HOSPITAL | Age: 23
LOS: 1 days | End: 2023-09-11

## 2023-09-11 DIAGNOSIS — Z01.419 ENCOUNTER FOR GYNECOLOGICAL EXAMINATION (GENERAL) (ROUTINE) WITHOUT ABNORMAL FINDINGS: ICD-10-CM

## 2023-09-12 DIAGNOSIS — Z01.419 ENCOUNTER FOR GYNECOLOGICAL EXAMINATION (GENERAL) (ROUTINE) WITHOUT ABNORMAL FINDINGS: ICD-10-CM

## 2023-09-13 LAB
C TRACH RRNA SPEC QL NAA+PROBE: NOT DETECTED
HCG UR QL: NEGATIVE
N GONORRHOEA RRNA SPEC QL NAA+PROBE: NOT DETECTED
QUALITY CONTROL: YES
SOURCE AMPLIFICATION: NORMAL

## 2023-09-15 DIAGNOSIS — Z01.419 ENCOUNTER FOR GYNECOLOGICAL EXAMINATION (GENERAL) (ROUTINE) WITHOUT ABNORMAL FINDINGS: ICD-10-CM

## 2023-09-20 LAB — CYTOLOGY CVX/VAG DOC THIN PREP: NORMAL

## 2023-09-29 ENCOUNTER — APPOINTMENT (OUTPATIENT)
Dept: OBGYN | Facility: CLINIC | Age: 23
End: 2023-09-29

## 2023-11-09 ENCOUNTER — INPATIENT (INPATIENT)
Facility: HOSPITAL | Age: 23
LOS: 2 days | Discharge: HOME | DRG: 816 | End: 2023-11-12
Attending: STUDENT IN AN ORGANIZED HEALTH CARE EDUCATION/TRAINING PROGRAM | Admitting: STUDENT IN AN ORGANIZED HEALTH CARE EDUCATION/TRAINING PROGRAM
Payer: MEDICAID

## 2023-11-09 VITALS
OXYGEN SATURATION: 99 % | WEIGHT: 152.12 LBS | HEART RATE: 100 BPM | SYSTOLIC BLOOD PRESSURE: 154 MMHG | DIASTOLIC BLOOD PRESSURE: 91 MMHG | RESPIRATION RATE: 20 BRPM

## 2023-11-09 DIAGNOSIS — T65.92XA TOXIC EFFECT OF UNSPECIFIED SUBSTANCE, INTENTIONAL SELF-HARM, INITIAL ENCOUNTER: ICD-10-CM

## 2023-11-09 LAB
ALBUMIN SERPL ELPH-MCNC: 4.7 G/DL — SIGNIFICANT CHANGE UP (ref 3.5–5.2)
ALBUMIN SERPL ELPH-MCNC: 4.7 G/DL — SIGNIFICANT CHANGE UP (ref 3.5–5.2)
ALP SERPL-CCNC: 98 U/L — SIGNIFICANT CHANGE UP (ref 30–115)
ALP SERPL-CCNC: 98 U/L — SIGNIFICANT CHANGE UP (ref 30–115)
ALT FLD-CCNC: 11 U/L — SIGNIFICANT CHANGE UP (ref 0–41)
ALT FLD-CCNC: 11 U/L — SIGNIFICANT CHANGE UP (ref 0–41)
ANION GAP SERPL CALC-SCNC: 14 MMOL/L — SIGNIFICANT CHANGE UP (ref 7–14)
ANION GAP SERPL CALC-SCNC: 14 MMOL/L — SIGNIFICANT CHANGE UP (ref 7–14)
APAP SERPL-MCNC: <5 UG/ML — LOW (ref 10–30)
APAP SERPL-MCNC: <5 UG/ML — LOW (ref 10–30)
AST SERPL-CCNC: 16 U/L — SIGNIFICANT CHANGE UP (ref 0–41)
AST SERPL-CCNC: 16 U/L — SIGNIFICANT CHANGE UP (ref 0–41)
BASE EXCESS BLDV CALC-SCNC: 0.3 MMOL/L — SIGNIFICANT CHANGE UP (ref -2–3)
BASE EXCESS BLDV CALC-SCNC: 0.3 MMOL/L — SIGNIFICANT CHANGE UP (ref -2–3)
BASOPHILS # BLD AUTO: 0.06 K/UL — SIGNIFICANT CHANGE UP (ref 0–0.2)
BASOPHILS # BLD AUTO: 0.06 K/UL — SIGNIFICANT CHANGE UP (ref 0–0.2)
BASOPHILS NFR BLD AUTO: 0.7 % — SIGNIFICANT CHANGE UP (ref 0–1)
BASOPHILS NFR BLD AUTO: 0.7 % — SIGNIFICANT CHANGE UP (ref 0–1)
BILIRUB SERPL-MCNC: 0.8 MG/DL — SIGNIFICANT CHANGE UP (ref 0.2–1.2)
BILIRUB SERPL-MCNC: 0.8 MG/DL — SIGNIFICANT CHANGE UP (ref 0.2–1.2)
BUN SERPL-MCNC: 8 MG/DL — LOW (ref 10–20)
BUN SERPL-MCNC: 8 MG/DL — LOW (ref 10–20)
CA-I SERPL-SCNC: 1.16 MMOL/L — SIGNIFICANT CHANGE UP (ref 1.15–1.33)
CA-I SERPL-SCNC: 1.16 MMOL/L — SIGNIFICANT CHANGE UP (ref 1.15–1.33)
CALCIUM SERPL-MCNC: 9.3 MG/DL — SIGNIFICANT CHANGE UP (ref 8.4–10.5)
CALCIUM SERPL-MCNC: 9.3 MG/DL — SIGNIFICANT CHANGE UP (ref 8.4–10.5)
CHLORIDE SERPL-SCNC: 105 MMOL/L — SIGNIFICANT CHANGE UP (ref 98–110)
CHLORIDE SERPL-SCNC: 105 MMOL/L — SIGNIFICANT CHANGE UP (ref 98–110)
CO2 SERPL-SCNC: 21 MMOL/L — SIGNIFICANT CHANGE UP (ref 17–32)
CO2 SERPL-SCNC: 21 MMOL/L — SIGNIFICANT CHANGE UP (ref 17–32)
CREAT SERPL-MCNC: 0.6 MG/DL — LOW (ref 0.7–1.5)
CREAT SERPL-MCNC: 0.6 MG/DL — LOW (ref 0.7–1.5)
EGFR: 130 ML/MIN/1.73M2 — SIGNIFICANT CHANGE UP
EGFR: 130 ML/MIN/1.73M2 — SIGNIFICANT CHANGE UP
EOSINOPHIL # BLD AUTO: 0.09 K/UL — SIGNIFICANT CHANGE UP (ref 0–0.7)
EOSINOPHIL # BLD AUTO: 0.09 K/UL — SIGNIFICANT CHANGE UP (ref 0–0.7)
EOSINOPHIL NFR BLD AUTO: 1 % — SIGNIFICANT CHANGE UP (ref 0–8)
EOSINOPHIL NFR BLD AUTO: 1 % — SIGNIFICANT CHANGE UP (ref 0–8)
ETHANOL SERPL-MCNC: <10 MG/DL — SIGNIFICANT CHANGE UP
ETHANOL SERPL-MCNC: <10 MG/DL — SIGNIFICANT CHANGE UP
GAS PNL BLDV: 137 MMOL/L — SIGNIFICANT CHANGE UP (ref 136–145)
GAS PNL BLDV: 137 MMOL/L — SIGNIFICANT CHANGE UP (ref 136–145)
GAS PNL BLDV: SIGNIFICANT CHANGE UP
GLUCOSE SERPL-MCNC: 97 MG/DL — SIGNIFICANT CHANGE UP (ref 70–99)
GLUCOSE SERPL-MCNC: 97 MG/DL — SIGNIFICANT CHANGE UP (ref 70–99)
HCG SERPL QL: NEGATIVE — SIGNIFICANT CHANGE UP
HCG SERPL QL: NEGATIVE — SIGNIFICANT CHANGE UP
HCO3 BLDV-SCNC: 24 MMOL/L — SIGNIFICANT CHANGE UP (ref 22–29)
HCO3 BLDV-SCNC: 24 MMOL/L — SIGNIFICANT CHANGE UP (ref 22–29)
HCT VFR BLD CALC: 38.3 % — SIGNIFICANT CHANGE UP (ref 37–47)
HCT VFR BLD CALC: 38.3 % — SIGNIFICANT CHANGE UP (ref 37–47)
HCT VFR BLDA CALC: 41 % — SIGNIFICANT CHANGE UP (ref 39–51)
HCT VFR BLDA CALC: 41 % — SIGNIFICANT CHANGE UP (ref 39–51)
HGB BLD CALC-MCNC: 13.5 G/DL — SIGNIFICANT CHANGE UP (ref 12.6–17.4)
HGB BLD CALC-MCNC: 13.5 G/DL — SIGNIFICANT CHANGE UP (ref 12.6–17.4)
HGB BLD-MCNC: 13.2 G/DL — SIGNIFICANT CHANGE UP (ref 12–16)
HGB BLD-MCNC: 13.2 G/DL — SIGNIFICANT CHANGE UP (ref 12–16)
IMM GRANULOCYTES NFR BLD AUTO: 0.2 % — SIGNIFICANT CHANGE UP (ref 0.1–0.3)
IMM GRANULOCYTES NFR BLD AUTO: 0.2 % — SIGNIFICANT CHANGE UP (ref 0.1–0.3)
LACTATE BLDV-MCNC: 1.1 MMOL/L — SIGNIFICANT CHANGE UP (ref 0.5–2)
LACTATE BLDV-MCNC: 1.1 MMOL/L — SIGNIFICANT CHANGE UP (ref 0.5–2)
LYMPHOCYTES # BLD AUTO: 3.31 K/UL — SIGNIFICANT CHANGE UP (ref 1.2–3.4)
LYMPHOCYTES # BLD AUTO: 3.31 K/UL — SIGNIFICANT CHANGE UP (ref 1.2–3.4)
LYMPHOCYTES # BLD AUTO: 37.9 % — SIGNIFICANT CHANGE UP (ref 20.5–51.1)
LYMPHOCYTES # BLD AUTO: 37.9 % — SIGNIFICANT CHANGE UP (ref 20.5–51.1)
MCHC RBC-ENTMCNC: 29.2 PG — SIGNIFICANT CHANGE UP (ref 27–31)
MCHC RBC-ENTMCNC: 29.2 PG — SIGNIFICANT CHANGE UP (ref 27–31)
MCHC RBC-ENTMCNC: 34.5 G/DL — SIGNIFICANT CHANGE UP (ref 32–37)
MCHC RBC-ENTMCNC: 34.5 G/DL — SIGNIFICANT CHANGE UP (ref 32–37)
MCV RBC AUTO: 84.7 FL — SIGNIFICANT CHANGE UP (ref 81–99)
MCV RBC AUTO: 84.7 FL — SIGNIFICANT CHANGE UP (ref 81–99)
MONOCYTES # BLD AUTO: 0.71 K/UL — HIGH (ref 0.1–0.6)
MONOCYTES # BLD AUTO: 0.71 K/UL — HIGH (ref 0.1–0.6)
MONOCYTES NFR BLD AUTO: 8.1 % — SIGNIFICANT CHANGE UP (ref 1.7–9.3)
MONOCYTES NFR BLD AUTO: 8.1 % — SIGNIFICANT CHANGE UP (ref 1.7–9.3)
NEUTROPHILS # BLD AUTO: 4.55 K/UL — SIGNIFICANT CHANGE UP (ref 1.4–6.5)
NEUTROPHILS # BLD AUTO: 4.55 K/UL — SIGNIFICANT CHANGE UP (ref 1.4–6.5)
NEUTROPHILS NFR BLD AUTO: 52.1 % — SIGNIFICANT CHANGE UP (ref 42.2–75.2)
NEUTROPHILS NFR BLD AUTO: 52.1 % — SIGNIFICANT CHANGE UP (ref 42.2–75.2)
NRBC # BLD: 0 /100 WBCS — SIGNIFICANT CHANGE UP (ref 0–0)
NRBC # BLD: 0 /100 WBCS — SIGNIFICANT CHANGE UP (ref 0–0)
PCO2 BLDV: 33 MMHG — LOW (ref 39–42)
PCO2 BLDV: 33 MMHG — LOW (ref 39–42)
PH BLDV: 7.46 — HIGH (ref 7.32–7.43)
PH BLDV: 7.46 — HIGH (ref 7.32–7.43)
PLATELET # BLD AUTO: 226 K/UL — SIGNIFICANT CHANGE UP (ref 130–400)
PLATELET # BLD AUTO: 226 K/UL — SIGNIFICANT CHANGE UP (ref 130–400)
PMV BLD: 11.5 FL — HIGH (ref 7.4–10.4)
PMV BLD: 11.5 FL — HIGH (ref 7.4–10.4)
PO2 BLDV: 29 MMHG — SIGNIFICANT CHANGE UP
PO2 BLDV: 29 MMHG — SIGNIFICANT CHANGE UP
POTASSIUM BLDV-SCNC: 3.2 MMOL/L — LOW (ref 3.5–5.1)
POTASSIUM BLDV-SCNC: 3.2 MMOL/L — LOW (ref 3.5–5.1)
POTASSIUM SERPL-MCNC: 3.6 MMOL/L — SIGNIFICANT CHANGE UP (ref 3.5–5)
POTASSIUM SERPL-MCNC: 3.6 MMOL/L — SIGNIFICANT CHANGE UP (ref 3.5–5)
POTASSIUM SERPL-SCNC: 3.6 MMOL/L — SIGNIFICANT CHANGE UP (ref 3.5–5)
POTASSIUM SERPL-SCNC: 3.6 MMOL/L — SIGNIFICANT CHANGE UP (ref 3.5–5)
PROT SERPL-MCNC: 7 G/DL — SIGNIFICANT CHANGE UP (ref 6–8)
PROT SERPL-MCNC: 7 G/DL — SIGNIFICANT CHANGE UP (ref 6–8)
RBC # BLD: 4.52 M/UL — SIGNIFICANT CHANGE UP (ref 4.2–5.4)
RBC # BLD: 4.52 M/UL — SIGNIFICANT CHANGE UP (ref 4.2–5.4)
RBC # FLD: 12.6 % — SIGNIFICANT CHANGE UP (ref 11.5–14.5)
RBC # FLD: 12.6 % — SIGNIFICANT CHANGE UP (ref 11.5–14.5)
SALICYLATES SERPL-MCNC: <0.3 MG/DL — LOW (ref 4–30)
SALICYLATES SERPL-MCNC: <0.3 MG/DL — LOW (ref 4–30)
SAO2 % BLDV: 50.9 % — SIGNIFICANT CHANGE UP
SAO2 % BLDV: 50.9 % — SIGNIFICANT CHANGE UP
SODIUM SERPL-SCNC: 140 MMOL/L — SIGNIFICANT CHANGE UP (ref 135–146)
SODIUM SERPL-SCNC: 140 MMOL/L — SIGNIFICANT CHANGE UP (ref 135–146)
WBC # BLD: 8.74 K/UL — SIGNIFICANT CHANGE UP (ref 4.8–10.8)
WBC # BLD: 8.74 K/UL — SIGNIFICANT CHANGE UP (ref 4.8–10.8)
WBC # FLD AUTO: 8.74 K/UL — SIGNIFICANT CHANGE UP (ref 4.8–10.8)
WBC # FLD AUTO: 8.74 K/UL — SIGNIFICANT CHANGE UP (ref 4.8–10.8)

## 2023-11-09 PROCEDURE — 93010 ELECTROCARDIOGRAM REPORT: CPT

## 2023-11-09 PROCEDURE — 99291 CRITICAL CARE FIRST HOUR: CPT

## 2023-11-09 PROCEDURE — 70498 CT ANGIOGRAPHY NECK: CPT | Mod: 26,MA

## 2023-11-09 PROCEDURE — 71045 X-RAY EXAM CHEST 1 VIEW: CPT | Mod: 26

## 2023-11-09 PROCEDURE — 71260 CT THORAX DX C+: CPT | Mod: 26,MA

## 2023-11-09 RX ORDER — HYDROMORPHONE HYDROCHLORIDE 2 MG/ML
1 INJECTION INTRAMUSCULAR; INTRAVENOUS; SUBCUTANEOUS ONCE
Refills: 0 | Status: DISCONTINUED | OUTPATIENT
Start: 2023-11-09 | End: 2023-11-09

## 2023-11-09 RX ORDER — SODIUM CHLORIDE 9 MG/ML
1000 INJECTION, SOLUTION INTRAVENOUS ONCE
Refills: 0 | Status: COMPLETED | OUTPATIENT
Start: 2023-11-09 | End: 2023-11-09

## 2023-11-09 RX ORDER — ONDANSETRON 8 MG/1
4 TABLET, FILM COATED ORAL ONCE
Refills: 0 | Status: COMPLETED | OUTPATIENT
Start: 2023-11-09 | End: 2023-11-09

## 2023-11-09 RX ORDER — PANTOPRAZOLE SODIUM 20 MG/1
8 TABLET, DELAYED RELEASE ORAL
Qty: 80 | Refills: 0 | Status: DISCONTINUED | OUTPATIENT
Start: 2023-11-09 | End: 2023-11-10

## 2023-11-09 RX ORDER — MORPHINE SULFATE 50 MG/1
4 CAPSULE, EXTENDED RELEASE ORAL ONCE
Refills: 0 | Status: DISCONTINUED | OUTPATIENT
Start: 2023-11-09 | End: 2023-11-09

## 2023-11-09 RX ORDER — PANTOPRAZOLE SODIUM 20 MG/1
40 TABLET, DELAYED RELEASE ORAL ONCE
Refills: 0 | Status: COMPLETED | OUTPATIENT
Start: 2023-11-09 | End: 2023-11-09

## 2023-11-09 RX ADMIN — ONDANSETRON 4 MILLIGRAM(S): 8 TABLET, FILM COATED ORAL at 21:35

## 2023-11-09 RX ADMIN — PANTOPRAZOLE SODIUM 40 MILLIGRAM(S): 20 TABLET, DELAYED RELEASE ORAL at 21:35

## 2023-11-09 RX ADMIN — HYDROMORPHONE HYDROCHLORIDE 1 MILLIGRAM(S): 2 INJECTION INTRAMUSCULAR; INTRAVENOUS; SUBCUTANEOUS at 22:37

## 2023-11-09 RX ADMIN — SODIUM CHLORIDE 1000 MILLILITER(S): 9 INJECTION, SOLUTION INTRAVENOUS at 22:53

## 2023-11-09 RX ADMIN — HYDROMORPHONE HYDROCHLORIDE 1 MILLIGRAM(S): 2 INJECTION INTRAMUSCULAR; INTRAVENOUS; SUBCUTANEOUS at 22:46

## 2023-11-09 RX ADMIN — MORPHINE SULFATE 4 MILLIGRAM(S): 50 CAPSULE, EXTENDED RELEASE ORAL at 21:34

## 2023-11-09 RX ADMIN — PANTOPRAZOLE SODIUM 10 MG/HR: 20 TABLET, DELAYED RELEASE ORAL at 22:53

## 2023-11-09 RX ADMIN — MORPHINE SULFATE 4 MILLIGRAM(S): 50 CAPSULE, EXTENDED RELEASE ORAL at 22:46

## 2023-11-09 NOTE — ED PROVIDER NOTE - OBJECTIVE STATEMENT
Patient is a 23-year-old female with no pertinent past medical history presenting for evaluation status post ingestion of 5 to 10 ounces of bleach shortly prior to arrival.  This was a suicide attempt.  Patient denies any visual or auditory hallucinations, homicidal ideations.  Per EMS patient told her boyfriend that she drank this and will, and he called EMS.  Patient has complaining of pain traveling from the throat down the esophagus into her chest. Using Hungarian  #[id number]: Patient is a 23-year-old female with no pertinent past medical history presenting for evaluation status post ingestion of 5 to 10 ounces of bleach shortly prior to arrival.  This was a suicide attempt.  Patient denies any visual or auditory hallucinations, homicidal ideations.  Per EMS patient told her boyfriend that she drank this and will, and he called EMS.  Patient has complaining of pain traveling from the throat down the esophagus into her chest.

## 2023-11-09 NOTE — ED PROVIDER NOTE - PHYSICAL EXAMINATION
VITAL SIGNS: I have reviewed nursing notes and confirm.  CONSTITUTIONAL: Uncomfortable appearing, non-toxic, gagging  SKIN: Warm dry, normal skin turgor  HEAD: NCAT  EYES: EOMI, PERRLA, no scleral icterus  ENT: Moist mucous membranes, normal pharynx with no erythema or exudates. No obvious burns in the oropharynx.  Airway patent.  No stridor.  NECK: Supple; non tender. Full ROM.   CHEST: No crepitus to palpation.  CARD: RRR, no murmurs, rubs or gallops  RESP: clear to ausculation b/l.  No rales, rhonchi, or wheezing.  ABD: soft, + BS, non-tender, non-distended, no rebound or guarding.   EXT: Full ROM, no bony tenderness, no pedal edema, no calf tenderness  PSYCH: Cooperative, appropriate.

## 2023-11-09 NOTE — ED PROVIDER NOTE - ATTENDING CONTRIBUTION TO CARE
I personally evaluated patient. I agree with the findings and plan with all documentation on chart except as documented  in my note.    23-year-old female no past medical history presents to the emergency department status post intentional bleach ingestion.  Patient was spoken to via [place]  and came in by EMS as a notification.  Patient drank about 5 to 10 ounces of bleach as an apparent suicide attempt.  Patient has severe burning to her throat and going down to her stomach is uncomfortable.  Patient denies any visual or auditory hallucinations and is otherwise answering questions appropriately but is in significant pain.  Patient denies any other coingestants.    Patient immediately brought into the critical care trauma bay.  Patient was placed on a monitor and is maintaining her airway and secretions.  Patient has noticed significant signs of burns to her mouth and has bilateral breath sounds.  Patient has epigastric tenderness with no guarding or rebound.  Patient nauseous and dry heaving and was immediately given pain control, Zofran, Protonix.  GI, toxicology, ENT consulted for care.  Patient started on a PPI drip.  Patient made n.p.o. and was placed on one-to-one observation.  Patient was scoped at bedside by ENT who shows no airway edema but does show erythema throughout the upper pharynx and esophagus.  Patient U pregnant negative and labs reviewed.  Patient ordered for CT angio of the neck and chest.  We will follow-up results and concern for caustic ingestion and esophageal and possible stomach injury.  We will follow-up work-up and reassess and patient on close monitoring of her airway, secretions, respiratory status.

## 2023-11-09 NOTE — ED PROVIDER NOTE - CLINICAL SUMMARY MEDICAL DECISION MAKING FREE TEXT BOX
23-year-old female no past medical history presents to the emergency department status post intentional bleach ingestion.  Patient was spoken to via [employer]  and came in by EMS as a notification.  Patient drank about 5 to 10 ounces of bleach as an apparent suicide attempt.  Patient has severe burning to her throat and going down to her stomach is uncomfortable.  Patient denies any visual or auditory hallucinations and is otherwise answering questions appropriately but is in significant pain.  Patient denies any other co-ingestants.    Patient immediately brought into the critical care trauma bay.  Patient was placed on a monitor and is maintaining her airway and secretions.  Patient has noticed significant signs of burns to her mouth and has bilateral breath sounds.  Patient has epigastric tenderness with no guarding or rebound.  Patient nauseous and dry heaving and was immediately given pain control, Zofran, Protonix.  GI, toxicology, ENT consulted for care.  Patient started on a PPI drip.  Patient made n.p.o. and was placed on one-to-one observation.  Patient was scoped at bedside by ENT who shows no airway edema but does show erythema throughout the upper pharynx and esophagus.  Patient U pregnant negative and labs reviewed.  Patient ordered for CT angio of the neck and chest.  We will follow-up results and concern for caustic ingestion and esophageal and possible stomach injury.

## 2023-11-09 NOTE — ED PROVIDER NOTE - PROGRESS NOTE DETAILS
MS–Case discussed with toxicology who recommended adding an additional CT abdomen and pelvis to ensure no gastric disruption.  They note if the patient is not drooling, stridorous, and vomiting then she just requires supportive care at this time.  Discussed plan with 's old notes that patient did have 1 episode of nonbloody nonbilious vomiting off the balcony shortly after ingestion.  He notes patient ingested 5 to 10 ounces between 9 to 9:30 PM. MS–patient was seen by ENT who scoped her and found erythema in the airway but no airway edema.  Case discussed with ICU fellow who will come and evaluate the patient and give recommendation for disposition.

## 2023-11-09 NOTE — CONSULT NOTE ADULT - SUBJECTIVE AND OBJECTIVE BOX
Using [employer]  #[id number]: Patient is a 23-year-old female with no pertinent past medical history presenting for evaluation status post ingestion of 5 to 10 ounces of bleach shortly prior to arrival.  This was a suicide attempt.  Patient denies any visual or auditory hallucinations, homicidal ideations.  Per EMS patient told her boyfriend that she drank this and he called EMS. According to pt  at bedside( serving for continuous interpretation) Patient is complaining of pain traveling from the throat down the esophagus into her chest. Pt states the pain in her throat and chest travels to her stomach and is severe but she denies any breathing difficulty. The pain is worse when she swallows.  Pt denies coughing up blood but she did vomit earlier    PAST MEDICAL & SURGICAL HISTORY:  No pertinent past medical history      No significant past surgical history        Allergies    No Known Allergies    Intolerances      MEDICATIONS  (STANDING):  HYDROmorphone  Injectable 1 milliGRAM(s) IV Push Once  pantoprazole Infusion 8 mG/Hr (10 mL/Hr) IV Continuous <Continuous>        ROS: ENT, GI, , CV, Pulm, Neuro, Psych, MS, Heme, Endo, Constitional; all negative except as noted in HPI    Vital Signs Last 24 Hrs  T(C): --  T(F): --  HR: 100 (09 Nov 2023 21:29) (100 - 100)  BP: 154/91 (09 Nov 2023 21:29) (154/91 - 154/91)  BP(mean): --  RR: 20 (09 Nov 2023 21:29) (20 - 20)  SpO2: 99% (09 Nov 2023 21:29) (99% - 99%)    Parameters below as of 09 Nov 2023 21:29  Patient On (Oxygen Delivery Method): room air                              13.2   8.74  )-----------( 226      ( 09 Nov 2023 21:57 )             38.3    11-09    140  |  105  |  8<L>  ----------------------------<  97  3.6   |  21  |  0.6<L>    Ca    9.3      09 Nov 2023 21:57    TPro  7.0  /  Alb  4.7  /  TBili  0.8  /  DBili  x   /  AST  16  /  ALT  11  /  AlkPhos  98  11-09       PHYSICAL EXAM:  Gen: NAD, well-developed  Head: Normocephalic, Atraumatic  Face: no edema/erythema/fluctuance, parotid glands soft without mass  Eyes: PERRL, EOMI, no scleral injection  Ears: Right - ear canal clear, TM intact without effusion            Left - ear canal clear, TM intact without effusion  Nose: Nares bilaterally patent, no discharge  Mouth: Lips dry, the oral pharynx is erythematous without any lesions noted.  Pt was not able to tolerate a FFL thru the nares so an oral pharyngeal examination was done.  The upper larynx was erythematous without edema.  The vocal cords appeared to be mobile.  The airway was grossly patent.  Neck: Flat, supple, no lymphadenopathy, trachea midline, no masses  Resp: breathing easily, no stridor  CV: no peripheral edema/cyanosis

## 2023-11-09 NOTE — ED ADULT NURSE NOTE - NSFALLUNIVINTERV_ED_ALL_ED
Bed/Stretcher in lowest position, wheels locked, appropriate side rails in place/Call bell, personal items and telephone in reach/Instruct patient to call for assistance before getting out of bed/chair/stretcher/Non-slip footwear applied when patient is off stretcher/[place] to call system/Physically safe environment - no spills, clutter or unnecessary equipment/Purposeful proactive rounding/Room/bathroom lighting operational, light cord in reach

## 2023-11-09 NOTE — CONSULT NOTE ADULT - PROBLEM SELECTOR RECOMMENDATION 9
Obtain CTscan of Neck and chest  GI evaluation  Continue PPI infusion  Monitor closely for respiratory distress and continuous pulse oximetry

## 2023-11-09 NOTE — ED ADULT NURSE NOTE - OBJECTIVE STATEMENT
pt is 22 y/o female bib s/p ingestion of bleach/suicide attempt. 1:1 initiated upon arrival. denies hallcunations/HI. c/o pain traveling from throat to chest. denies sob. 1 episode of vomitting.

## 2023-11-09 NOTE — CONSULT NOTE ADULT - SUBJECTIVE AND OBJECTIVE BOX
MEDICAL TOXICOLOGY CONSULT    HPI:  23-year-old female with no pertinent past medical history presenting for evaluation status post ingestion of 5 to 10 ounces of household bleach shortly prior to arrival.  This was a suicide attempt.  Patient denies any visual or auditory hallucinations, homicidal ideations.  Per EMS patient told her boyfriend that she drank this and he called EMS.  Patient has complaining of pain traveling from the throat down the esophagus into her chest. Vitals are as follows: /91, , SpO2 99% on room air, RR 20. No oropharyngeal lesions visualized on exam. No evidence of stridor, drooling, vomiting. Abd is soft and non tender. Pt is mentating well.       ONSET / TIME of exposure(s):    QUANTITY of exposure(s):    ROUTE of exposure:  ___ (INGESTION, DERMAL, INHALATION, or UNKNOWN)    CONTEXT of exposure: ___ (at home, found down on street, found wandering by EMS)    ASSOCIATED symptoms:    PAST MEDICAL & SURGICAL HISTORY:  No pertinent past medical history      No significant past surgical history          MEDICATION HISTORY:  HYDROmorphone  Injectable 1 milliGRAM(s) IV Push Once  pantoprazole Infusion 8 mG/Hr IV Continuous <Continuous>      FAMILY HISTORY:  No pertinent family history in first degree relatives        REVIEW OF SYSTEMS:   As per ED provider       Vital Signs Last 24 Hrs  HR: 100 (09 Nov 2023 21:29) (100 - 100)  BP: 154/91 (09 Nov 2023 21:29) (154/91 - 154/91)  RR: 20 (09 Nov 2023 21:29) (20 - 20)  SpO2: 99% (09 Nov 2023 21:29) (99% - 99%)    Parameters below as of 09 Nov 2023 21:29  Patient On (Oxygen Delivery Method): room air        SIGNIFICANT LABORATORY STUDIES:                        13.2   8.74  )-----------( 226      ( 09 Nov 2023 21:57 )             38.3

## 2023-11-10 ENCOUNTER — RESULT REVIEW (OUTPATIENT)
Age: 23
End: 2023-11-10

## 2023-11-10 ENCOUNTER — TRANSCRIPTION ENCOUNTER (OUTPATIENT)
Age: 23
End: 2023-11-10

## 2023-11-10 DIAGNOSIS — T54.91XA TOXIC EFFECT OF UNSPECIFIED CORROSIVE SUBSTANCE, ACCIDENTAL (UNINTENTIONAL), INITIAL ENCOUNTER: ICD-10-CM

## 2023-11-10 LAB
ALBUMIN SERPL ELPH-MCNC: 4 G/DL — SIGNIFICANT CHANGE UP (ref 3.5–5.2)
ALBUMIN SERPL ELPH-MCNC: 4 G/DL — SIGNIFICANT CHANGE UP (ref 3.5–5.2)
ALP SERPL-CCNC: 81 U/L — SIGNIFICANT CHANGE UP (ref 30–115)
ALP SERPL-CCNC: 81 U/L — SIGNIFICANT CHANGE UP (ref 30–115)
ALT FLD-CCNC: 8 U/L — SIGNIFICANT CHANGE UP (ref 0–41)
ALT FLD-CCNC: 8 U/L — SIGNIFICANT CHANGE UP (ref 0–41)
AMPHET UR-MCNC: NEGATIVE — SIGNIFICANT CHANGE UP
AMPHET UR-MCNC: NEGATIVE — SIGNIFICANT CHANGE UP
ANION GAP SERPL CALC-SCNC: 8 MMOL/L — SIGNIFICANT CHANGE UP (ref 7–14)
ANION GAP SERPL CALC-SCNC: 8 MMOL/L — SIGNIFICANT CHANGE UP (ref 7–14)
APAP SERPL-MCNC: <5 UG/ML — LOW (ref 10–30)
APAP SERPL-MCNC: <5 UG/ML — LOW (ref 10–30)
APPEARANCE UR: CLEAR — SIGNIFICANT CHANGE UP
APPEARANCE UR: CLEAR — SIGNIFICANT CHANGE UP
AST SERPL-CCNC: 13 U/L — SIGNIFICANT CHANGE UP (ref 0–41)
AST SERPL-CCNC: 13 U/L — SIGNIFICANT CHANGE UP (ref 0–41)
BARBITURATES UR SCN-MCNC: NEGATIVE — SIGNIFICANT CHANGE UP
BARBITURATES UR SCN-MCNC: NEGATIVE — SIGNIFICANT CHANGE UP
BASOPHILS # BLD AUTO: 0.04 K/UL — SIGNIFICANT CHANGE UP (ref 0–0.2)
BASOPHILS # BLD AUTO: 0.04 K/UL — SIGNIFICANT CHANGE UP (ref 0–0.2)
BASOPHILS NFR BLD AUTO: 0.5 % — SIGNIFICANT CHANGE UP (ref 0–1)
BASOPHILS NFR BLD AUTO: 0.5 % — SIGNIFICANT CHANGE UP (ref 0–1)
BENZODIAZ UR-MCNC: NEGATIVE — SIGNIFICANT CHANGE UP
BENZODIAZ UR-MCNC: NEGATIVE — SIGNIFICANT CHANGE UP
BILIRUB SERPL-MCNC: 1.1 MG/DL — SIGNIFICANT CHANGE UP (ref 0.2–1.2)
BILIRUB SERPL-MCNC: 1.1 MG/DL — SIGNIFICANT CHANGE UP (ref 0.2–1.2)
BILIRUB UR-MCNC: NEGATIVE — SIGNIFICANT CHANGE UP
BILIRUB UR-MCNC: NEGATIVE — SIGNIFICANT CHANGE UP
BUN SERPL-MCNC: 6 MG/DL — LOW (ref 10–20)
BUN SERPL-MCNC: 6 MG/DL — LOW (ref 10–20)
CALCIUM SERPL-MCNC: 8.9 MG/DL — SIGNIFICANT CHANGE UP (ref 8.4–10.4)
CALCIUM SERPL-MCNC: 8.9 MG/DL — SIGNIFICANT CHANGE UP (ref 8.4–10.4)
CHLORIDE SERPL-SCNC: 111 MMOL/L — HIGH (ref 98–110)
CHLORIDE SERPL-SCNC: 111 MMOL/L — HIGH (ref 98–110)
CO2 SERPL-SCNC: 24 MMOL/L — SIGNIFICANT CHANGE UP (ref 17–32)
CO2 SERPL-SCNC: 24 MMOL/L — SIGNIFICANT CHANGE UP (ref 17–32)
COCAINE METAB.OTHER UR-MCNC: NEGATIVE — SIGNIFICANT CHANGE UP
COCAINE METAB.OTHER UR-MCNC: NEGATIVE — SIGNIFICANT CHANGE UP
COLOR SPEC: YELLOW — SIGNIFICANT CHANGE UP
COLOR SPEC: YELLOW — SIGNIFICANT CHANGE UP
CREAT SERPL-MCNC: 0.6 MG/DL — LOW (ref 0.7–1.5)
CREAT SERPL-MCNC: 0.6 MG/DL — LOW (ref 0.7–1.5)
DIFF PNL FLD: NEGATIVE — SIGNIFICANT CHANGE UP
DIFF PNL FLD: NEGATIVE — SIGNIFICANT CHANGE UP
EGFR: 130 ML/MIN/1.73M2 — SIGNIFICANT CHANGE UP
EGFR: 130 ML/MIN/1.73M2 — SIGNIFICANT CHANGE UP
EOSINOPHIL # BLD AUTO: 0.09 K/UL — SIGNIFICANT CHANGE UP (ref 0–0.7)
EOSINOPHIL # BLD AUTO: 0.09 K/UL — SIGNIFICANT CHANGE UP (ref 0–0.7)
EOSINOPHIL NFR BLD AUTO: 1.1 % — SIGNIFICANT CHANGE UP (ref 0–8)
EOSINOPHIL NFR BLD AUTO: 1.1 % — SIGNIFICANT CHANGE UP (ref 0–8)
GLUCOSE SERPL-MCNC: 90 MG/DL — SIGNIFICANT CHANGE UP (ref 70–99)
GLUCOSE SERPL-MCNC: 90 MG/DL — SIGNIFICANT CHANGE UP (ref 70–99)
GLUCOSE UR QL: NEGATIVE MG/DL — SIGNIFICANT CHANGE UP
GLUCOSE UR QL: NEGATIVE MG/DL — SIGNIFICANT CHANGE UP
HCT VFR BLD CALC: 32.6 % — LOW (ref 37–47)
HCT VFR BLD CALC: 32.6 % — LOW (ref 37–47)
HCT VFR BLD CALC: 34 % — LOW (ref 37–47)
HCT VFR BLD CALC: 34 % — LOW (ref 37–47)
HGB BLD-MCNC: 11.1 G/DL — LOW (ref 12–16)
HGB BLD-MCNC: 11.1 G/DL — LOW (ref 12–16)
HGB BLD-MCNC: 11.4 G/DL — LOW (ref 12–16)
HGB BLD-MCNC: 11.4 G/DL — LOW (ref 12–16)
IMM GRANULOCYTES NFR BLD AUTO: 0.3 % — SIGNIFICANT CHANGE UP (ref 0.1–0.3)
IMM GRANULOCYTES NFR BLD AUTO: 0.3 % — SIGNIFICANT CHANGE UP (ref 0.1–0.3)
KETONES UR-MCNC: NEGATIVE MG/DL — SIGNIFICANT CHANGE UP
KETONES UR-MCNC: NEGATIVE MG/DL — SIGNIFICANT CHANGE UP
LACTATE SERPL-SCNC: 0.7 MMOL/L — SIGNIFICANT CHANGE UP (ref 0.7–2)
LACTATE SERPL-SCNC: 0.7 MMOL/L — SIGNIFICANT CHANGE UP (ref 0.7–2)
LEUKOCYTE ESTERASE UR-ACNC: NEGATIVE — SIGNIFICANT CHANGE UP
LEUKOCYTE ESTERASE UR-ACNC: NEGATIVE — SIGNIFICANT CHANGE UP
LYMPHOCYTES # BLD AUTO: 3.27 K/UL — SIGNIFICANT CHANGE UP (ref 1.2–3.4)
LYMPHOCYTES # BLD AUTO: 3.27 K/UL — SIGNIFICANT CHANGE UP (ref 1.2–3.4)
LYMPHOCYTES # BLD AUTO: 41.6 % — SIGNIFICANT CHANGE UP (ref 20.5–51.1)
LYMPHOCYTES # BLD AUTO: 41.6 % — SIGNIFICANT CHANGE UP (ref 20.5–51.1)
MAGNESIUM SERPL-MCNC: 1.8 MG/DL — SIGNIFICANT CHANGE UP (ref 1.8–2.4)
MAGNESIUM SERPL-MCNC: 1.8 MG/DL — SIGNIFICANT CHANGE UP (ref 1.8–2.4)
MCHC RBC-ENTMCNC: 28.6 PG — SIGNIFICANT CHANGE UP (ref 27–31)
MCHC RBC-ENTMCNC: 28.6 PG — SIGNIFICANT CHANGE UP (ref 27–31)
MCHC RBC-ENTMCNC: 29.1 PG — SIGNIFICANT CHANGE UP (ref 27–31)
MCHC RBC-ENTMCNC: 29.1 PG — SIGNIFICANT CHANGE UP (ref 27–31)
MCHC RBC-ENTMCNC: 33.5 G/DL — SIGNIFICANT CHANGE UP (ref 32–37)
MCHC RBC-ENTMCNC: 33.5 G/DL — SIGNIFICANT CHANGE UP (ref 32–37)
MCHC RBC-ENTMCNC: 34 G/DL — SIGNIFICANT CHANGE UP (ref 32–37)
MCHC RBC-ENTMCNC: 34 G/DL — SIGNIFICANT CHANGE UP (ref 32–37)
MCV RBC AUTO: 85.3 FL — SIGNIFICANT CHANGE UP (ref 81–99)
MCV RBC AUTO: 85.3 FL — SIGNIFICANT CHANGE UP (ref 81–99)
MCV RBC AUTO: 85.4 FL — SIGNIFICANT CHANGE UP (ref 81–99)
MCV RBC AUTO: 85.4 FL — SIGNIFICANT CHANGE UP (ref 81–99)
METHADONE UR-MCNC: NEGATIVE — SIGNIFICANT CHANGE UP
METHADONE UR-MCNC: NEGATIVE — SIGNIFICANT CHANGE UP
MONOCYTES # BLD AUTO: 0.59 K/UL — SIGNIFICANT CHANGE UP (ref 0.1–0.6)
MONOCYTES # BLD AUTO: 0.59 K/UL — SIGNIFICANT CHANGE UP (ref 0.1–0.6)
MONOCYTES NFR BLD AUTO: 7.5 % — SIGNIFICANT CHANGE UP (ref 1.7–9.3)
MONOCYTES NFR BLD AUTO: 7.5 % — SIGNIFICANT CHANGE UP (ref 1.7–9.3)
NEUTROPHILS # BLD AUTO: 3.86 K/UL — SIGNIFICANT CHANGE UP (ref 1.4–6.5)
NEUTROPHILS # BLD AUTO: 3.86 K/UL — SIGNIFICANT CHANGE UP (ref 1.4–6.5)
NEUTROPHILS NFR BLD AUTO: 49 % — SIGNIFICANT CHANGE UP (ref 42.2–75.2)
NEUTROPHILS NFR BLD AUTO: 49 % — SIGNIFICANT CHANGE UP (ref 42.2–75.2)
NITRITE UR-MCNC: NEGATIVE — SIGNIFICANT CHANGE UP
NITRITE UR-MCNC: NEGATIVE — SIGNIFICANT CHANGE UP
NRBC # BLD: 0 /100 WBCS — SIGNIFICANT CHANGE UP (ref 0–0)
OPIATES UR-MCNC: POSITIVE
OPIATES UR-MCNC: POSITIVE
OSMOLALITY SERPL: 291 MOS/KG — SIGNIFICANT CHANGE UP (ref 275–300)
OSMOLALITY SERPL: 291 MOS/KG — SIGNIFICANT CHANGE UP (ref 275–300)
PCP SPEC-MCNC: SIGNIFICANT CHANGE UP
PCP SPEC-MCNC: SIGNIFICANT CHANGE UP
PH UR: 5.5 — SIGNIFICANT CHANGE UP (ref 5–8)
PH UR: 5.5 — SIGNIFICANT CHANGE UP (ref 5–8)
PLATELET # BLD AUTO: 203 K/UL — SIGNIFICANT CHANGE UP (ref 130–400)
PLATELET # BLD AUTO: 203 K/UL — SIGNIFICANT CHANGE UP (ref 130–400)
PLATELET # BLD AUTO: 219 K/UL — SIGNIFICANT CHANGE UP (ref 130–400)
PLATELET # BLD AUTO: 219 K/UL — SIGNIFICANT CHANGE UP (ref 130–400)
PMV BLD: 11.7 FL — HIGH (ref 7.4–10.4)
POTASSIUM SERPL-MCNC: 3.9 MMOL/L — SIGNIFICANT CHANGE UP (ref 3.5–5)
POTASSIUM SERPL-MCNC: 3.9 MMOL/L — SIGNIFICANT CHANGE UP (ref 3.5–5)
POTASSIUM SERPL-SCNC: 3.9 MMOL/L — SIGNIFICANT CHANGE UP (ref 3.5–5)
POTASSIUM SERPL-SCNC: 3.9 MMOL/L — SIGNIFICANT CHANGE UP (ref 3.5–5)
PROPOXYPHENE QUALITATIVE URINE RESULT: NEGATIVE — SIGNIFICANT CHANGE UP
PROPOXYPHENE QUALITATIVE URINE RESULT: NEGATIVE — SIGNIFICANT CHANGE UP
PROT SERPL-MCNC: 5.5 G/DL — LOW (ref 6–8)
PROT SERPL-MCNC: 5.5 G/DL — LOW (ref 6–8)
PROT UR-MCNC: NEGATIVE MG/DL — SIGNIFICANT CHANGE UP
PROT UR-MCNC: NEGATIVE MG/DL — SIGNIFICANT CHANGE UP
RBC # BLD: 3.82 M/UL — LOW (ref 4.2–5.4)
RBC # BLD: 3.82 M/UL — LOW (ref 4.2–5.4)
RBC # BLD: 3.98 M/UL — LOW (ref 4.2–5.4)
RBC # BLD: 3.98 M/UL — LOW (ref 4.2–5.4)
RBC # FLD: 12.4 % — SIGNIFICANT CHANGE UP (ref 11.5–14.5)
RBC # FLD: 12.4 % — SIGNIFICANT CHANGE UP (ref 11.5–14.5)
RBC # FLD: 12.5 % — SIGNIFICANT CHANGE UP (ref 11.5–14.5)
RBC # FLD: 12.5 % — SIGNIFICANT CHANGE UP (ref 11.5–14.5)
SALICYLATES SERPL-MCNC: <0.3 MG/DL — LOW (ref 4–30)
SALICYLATES SERPL-MCNC: <0.3 MG/DL — LOW (ref 4–30)
SODIUM SERPL-SCNC: 143 MMOL/L — SIGNIFICANT CHANGE UP (ref 135–146)
SODIUM SERPL-SCNC: 143 MMOL/L — SIGNIFICANT CHANGE UP (ref 135–146)
SP GR SPEC: >1.03 — HIGH (ref 1–1.03)
SP GR SPEC: >1.03 — HIGH (ref 1–1.03)
UROBILINOGEN FLD QL: 0.2 MG/DL — SIGNIFICANT CHANGE UP (ref 0.2–1)
UROBILINOGEN FLD QL: 0.2 MG/DL — SIGNIFICANT CHANGE UP (ref 0.2–1)
WBC # BLD: 6.43 K/UL — SIGNIFICANT CHANGE UP (ref 4.8–10.8)
WBC # BLD: 6.43 K/UL — SIGNIFICANT CHANGE UP (ref 4.8–10.8)
WBC # BLD: 7.87 K/UL — SIGNIFICANT CHANGE UP (ref 4.8–10.8)
WBC # BLD: 7.87 K/UL — SIGNIFICANT CHANGE UP (ref 4.8–10.8)
WBC # FLD AUTO: 6.43 K/UL — SIGNIFICANT CHANGE UP (ref 4.8–10.8)
WBC # FLD AUTO: 6.43 K/UL — SIGNIFICANT CHANGE UP (ref 4.8–10.8)
WBC # FLD AUTO: 7.87 K/UL — SIGNIFICANT CHANGE UP (ref 4.8–10.8)
WBC # FLD AUTO: 7.87 K/UL — SIGNIFICANT CHANGE UP (ref 4.8–10.8)

## 2023-11-10 PROCEDURE — 74177 CT ABD & PELVIS W/CONTRAST: CPT | Mod: 26,MA

## 2023-11-10 PROCEDURE — 43239 EGD BIOPSY SINGLE/MULTIPLE: CPT

## 2023-11-10 PROCEDURE — 31575 DIAGNOSTIC LARYNGOSCOPY: CPT

## 2023-11-10 PROCEDURE — 85025 COMPLETE CBC W/AUTO DIFF WBC: CPT

## 2023-11-10 PROCEDURE — 83735 ASSAY OF MAGNESIUM: CPT

## 2023-11-10 PROCEDURE — 36415 COLL VENOUS BLD VENIPUNCTURE: CPT

## 2023-11-10 PROCEDURE — 83930 ASSAY OF BLOOD OSMOLALITY: CPT

## 2023-11-10 PROCEDURE — 99223 1ST HOSP IP/OBS HIGH 75: CPT

## 2023-11-10 PROCEDURE — 85027 COMPLETE CBC AUTOMATED: CPT

## 2023-11-10 PROCEDURE — 83605 ASSAY OF LACTIC ACID: CPT

## 2023-11-10 PROCEDURE — 88312 SPECIAL STAINS GROUP 1: CPT

## 2023-11-10 PROCEDURE — 88305 TISSUE EXAM BY PATHOLOGIST: CPT

## 2023-11-10 PROCEDURE — 88312 SPECIAL STAINS GROUP 1: CPT | Mod: 26

## 2023-11-10 PROCEDURE — 80361 OPIATES 1 OR MORE: CPT

## 2023-11-10 PROCEDURE — 99221 1ST HOSP IP/OBS SF/LOW 40: CPT | Mod: 25

## 2023-11-10 PROCEDURE — 80321 ALCOHOLS BIOMARKERS 1OR 2: CPT

## 2023-11-10 PROCEDURE — 80307 DRUG TEST PRSMV CHEM ANLYZR: CPT

## 2023-11-10 PROCEDURE — C9113: CPT

## 2023-11-10 PROCEDURE — 81003 URINALYSIS AUTO W/O SCOPE: CPT

## 2023-11-10 PROCEDURE — 88305 TISSUE EXAM BY PATHOLOGIST: CPT | Mod: 26

## 2023-11-10 PROCEDURE — 80053 COMPREHEN METABOLIC PANEL: CPT

## 2023-11-10 RX ORDER — SODIUM CHLORIDE 9 MG/ML
1000 INJECTION, SOLUTION INTRAVENOUS
Refills: 0 | Status: DISCONTINUED | OUTPATIENT
Start: 2023-11-10 | End: 2023-11-12

## 2023-11-10 RX ORDER — CHLORHEXIDINE GLUCONATE 213 G/1000ML
1 SOLUTION TOPICAL
Refills: 0 | Status: DISCONTINUED | OUTPATIENT
Start: 2023-11-10 | End: 2023-11-12

## 2023-11-10 RX ORDER — PANTOPRAZOLE SODIUM 20 MG/1
40 TABLET, DELAYED RELEASE ORAL
Refills: 0 | Status: DISCONTINUED | OUTPATIENT
Start: 2023-11-10 | End: 2023-11-12

## 2023-11-10 RX ORDER — ENOXAPARIN SODIUM 100 MG/ML
40 INJECTION SUBCUTANEOUS EVERY 24 HOURS
Refills: 0 | Status: DISCONTINUED | OUTPATIENT
Start: 2023-11-10 | End: 2023-11-12

## 2023-11-10 RX ADMIN — PANTOPRAZOLE SODIUM 40 MILLIGRAM(S): 20 TABLET, DELAYED RELEASE ORAL at 17:14

## 2023-11-10 RX ADMIN — ENOXAPARIN SODIUM 40 MILLIGRAM(S): 100 INJECTION SUBCUTANEOUS at 17:15

## 2023-11-10 RX ADMIN — CHLORHEXIDINE GLUCONATE 1 APPLICATION(S): 213 SOLUTION TOPICAL at 06:19

## 2023-11-10 RX ADMIN — SODIUM CHLORIDE 1000 MILLILITER(S): 9 INJECTION, SOLUTION INTRAVENOUS at 00:41

## 2023-11-10 RX ADMIN — PANTOPRAZOLE SODIUM 10 MG/HR: 20 TABLET, DELAYED RELEASE ORAL at 05:46

## 2023-11-10 RX ADMIN — SODIUM CHLORIDE 75 MILLILITER(S): 9 INJECTION, SOLUTION INTRAVENOUS at 04:06

## 2023-11-10 NOTE — H&P ADULT - NSHPREVIEWOFSYSTEMS_GEN_ALL_CORE
CONSTITUTIONAL: No weakness, fevers or chills  EYES/ENT: Lips dry, the oral pharynx is erythematous without any lesions noted.  Pt was not able to tolerate a FFL thru the nares so an oral pharyngeal examination was done.  The upper larynx was erythematous without edema.  The vocal cords appeared to be mobile.  The airway was grossly patent.   NECK: + pain or stiffness  RESPIRATORY: No cough, wheezing, hemoptysis; No shortness of breath  CARDIOVASCULAR: chest pain radiating to stomach  GASTROINTESTINAL: No abdominal or epigastric pain. one episode of vomiting,  No diarrhea or constipation. No melena or hematochezia.  GENITOURINARY: No dysuria, frequency or hematuria  NEUROLOGICAL: No numbness or weakness  SKIN: No itching, rashes

## 2023-11-10 NOTE — PROGRESS NOTE ADULT - ASSESSMENT
Patient is a 23y old F w/no pertinent PMH presenting s/p suicide attempt w/5-10 mL bleach ingestion w/pain from throat down her esophagus to stomach w/exacerbation when swallowing and 1 episode of NBNB vomitting, w/ED vitals stable, labs unremarkable, and CXR, CT CAP, and CTA neck unremarkable now admitted to MICU for management of bleach ingestion. Of note, she attempted suicide because her social security was denied 2 days ago and she was stressed as she was a mother of 2 children, one 5 months old and one 3 years old.    # Suicide attempt, no prior attempts  # No hx of underlying depression or post partum psychosis  # Bleach ingestion  *CTA Neck (11/9): No evidence of active bleed. No evidence for saccular aneurysm, vascular malformation, or large vessel occlusion.  *CT CAP IC (11/9): No evidence of acute intrathoracic or abdominal pathology.  *CXR (11/9): No radiographic evidence of acute cardiopulmonary disease.  - Not on ventilation, continue monitoring closely for respiratory distress  - Aspiration precautions  - Toxicology recs (11/10): Obtain routine toxicological labs including CBC, CMP, serum acetaminophen, salicylate, ethanol, and EKG  - F/u serum acetaminophen, salicylate, ethanol, and EKG  - NPO for now, GI planning EGD 11/10  - C/w protonix gtt  - Appreciate GI, ENT, and toxicology recs    #MISC  - DVT PPx: Not indicated  - GI PPx: Protonix drip  - Diet: NPO for EGD  - Activity: Increase as tolerated Patient is a 23y old F w/no pertinent PMH presenting s/p suicide attempt w/5-10 mL bleach ingestion w/pain from throat down her esophagus to stomach w/exacerbation when swallowing and 1 episode of NBNB vomitting, w/ED vitals stable, labs unremarkable, and CXR, CT CAP, and CTA neck unremarkable now admitted to MICU for management of bleach ingestion. Of note, she attempted suicide because her social security was denied 2 days ago and she was stressed as she was a mother of 2 children, one 5 months old and one 3 years old.    # Suicide attempt, no prior attempts  # No hx of underlying depression or post partum psychosis  # Bleach ingestion  *CTA Neck (11/9): No evidence of active bleed. No evidence for saccular aneurysm, vascular malformation, or large vessel occlusion.  *CT CAP IC (11/9): No evidence of acute intrathoracic or abdominal pathology.  *CXR (11/9): No radiographic evidence of acute cardiopulmonary disease.  - Not on ventilation, continue monitoring closely for respiratory distress  - Aspiration precautions  - Toxicology recs (11/10): Obtain routine toxicological labs including CBC, CMP, serum acetaminophen, salicylate, ethanol, and EKG  - F/u serum acetaminophen, salicylate, ethanol, and EKG  - F/u serum osm  - NPO for now, GI planning EGD 11/10  - D/c protonix gtt, start protonix IV bid  - C/w LR @ 75 cc/hr  - Obtain psych eval  - Appreciate GI, ENT, and toxicology recs    #MISC  - DVT PPx: Not indicated  - GI PPx: Protonix drip  - Diet: NPO for EGD  - Activity: Increase as tolerated  - Lines: Peripheral IV  - Drips: None  - Dispo: DGTF pending EGD

## 2023-11-10 NOTE — PATIENT PROFILE ADULT - NSPRESCRALCFREQ_GEN_A_NUR
Patient attended Phase 2 Cardiac Rehab Exercise Session. Further documentation will be scanned into the medical record upon discharge.  
Never

## 2023-11-10 NOTE — CONSULT NOTE ADULT - SUBJECTIVE AND OBJECTIVE BOX
Gastroenterology Consultation:    Patient is a 23y old  Female who presents with a chief complaint of Suicide attempt (10 Nov 2023 08:15)        Admitted on: 11-10-23      HPI:   23-year-old female with no pertinent past medical history presenting for evaluation status post ingestion of 5 to 10 ounces of bleach yesterday as a  suicide attempt.  Patient denies any visual or auditory hallucinations, homicidal ideations.  Patient is complaining of burning  painfrom the throat down the esophagus into her chest. Denies any breathing difficulty or stridor. The pain is worse when she swallows.  Pt denies coughing up blood but she did vomit earlier. According to the patient she attempted suicide because her social security was refused 2 days ago and she was stressed about it.       Prior EGD: never had any    Prior Colonoscopy: never had any      PAST MEDICAL & SURGICAL HISTORY:  No pertinent past medical history      No significant past surgical history    FAMILY HISTORY:  No pertinent family history in first degree relatives        Social History:  Tobacco: denies  Alcohol: denies  Drugs: denies    Home Medications:  acetaminophen 325 mg oral tablet: 3 tab(s) orally every 6 hours (11 May 2023 06:31)  benzocaine 20% topical spray: 1 Apply topically to affected area every 6 hours As needed for Perineal discomfort (11 May 2023 06:31)  ibuprofen 600 mg oral tablet: 1 tab(s) orally every 6 hours (11 May 2023 06:31)  magnesium hydroxide 8% oral suspension: 30 milliliter(s) orally 2 times a day As needed Constipation (11 May 2023 06:31)  Prenatal Multivitamins with Folic Acid 1 mg oral tablet: 1 tab(s) orally once a day (11 May 2023 06:31)  simethicone 80 mg oral tablet, chewable: 1 tab(s) orally every 4 hours As needed Gas (11 May 2023 06:31)        MEDICATIONS  (STANDING):  chlorhexidine 2% Cloths 1 Application(s) Topical <User Schedule>  enoxaparin Injectable 40 milliGRAM(s) SubCutaneous every 24 hours  lactated ringers. 1000 milliLiter(s) (75 mL/Hr) IV Continuous <Continuous>  pantoprazole  Injectable 40 milliGRAM(s) IV Push two times a day    MEDICATIONS  (PRN):      Allergies  No Known Allergies      Review of Systems:   Constitutional:  No Fever, No Chills  ENT/Mouth:  No Hearing Changes,  No Difficulty Swallowing  Eyes:  No Eye Pain, No Vision Changes  Cardiovascular:  No Chest Pain, No Palpitations  Respiratory:  No Cough, No Dyspnea  Gastrointestinal:  As described in HPI          Physical Examination:  T(C): 36.4 (11-10-23 @ 08:00), Max: 37.1 (11-09-23 @ 22:41)  HR: 58 (11-10-23 @ 10:00) (49 - 100)  BP: 104/59 (11-10-23 @ 10:00) (98/54 - 154/91)  RR: 18 (11-10-23 @ 10:00) (14 - 21)  SpO2: 98% (11-10-23 @ 10:00) (97% - 100%)  Height (cm): 173 (11-10-23 @ 10:39)  Weight (kg): 66.5 (11-10-23 @ 10:39)    11-09-23 @ 07:01  -  11-10-23 @ 07:00  --------------------------------------------------------  IN: 350 mL / OUT: 400 mL / NET: -50 mL          GENERAL: AAOx3, no acute distress.  HEAD:  Atraumatic, Normocephalic  EYES: conjunctiva and sclera clear  CHEST/LUNG: Clear to auscultation bilaterally; No wheeze, rhonchi, or rales  HEART: Regular rate and rhythm; normal S1, S2, No murmurs.  ABDOMEN: Soft, nontender, nondistended; Bowel sounds present  SKIN: Intact, no jaundice        Data:                        11.1   7.87  )-----------( 203      ( 10 Nov 2023 04:45 )             32.6     Hgb Trend:  11.1  11-10-23 @ 04:45  13.2  11-09-23 @ 21:57        11-10    143  |  111<H>  |  6<L>  ----------------------------<  90  3.9   |  24  |  0.6<L>    Ca    8.9      10 Nov 2023 04:45  Mg     1.8     11-10    TPro  5.5<L>  /  Alb  4.0  /  TBili  1.1  /  DBili  x   /  AST  13  /  ALT  8   /  AlkPhos  81  11-10    Liver panel trend:  TBili 1.1   /   AST 13   /   ALT 8   /   AlkP 81   /   Tptn 5.5   /   Alb 4.0    /   DBili --      11-10  TBili 0.8   /   AST 16   /   ALT 11   /   AlkP 98   /   Tptn 7.0   /   Alb 4.7    /   DBili --      11-09              Radiology:  CT Abdomen and Pelvis w/ IV Cont:   ACC: [id number] EXAM:  CT CHEST IC   ORDERED BY: DIANE TALBOT     ACC: [id number] EXAM:  CT ABDOMEN AND PELVIS IC   ORDERED BY: DIANE TALBOT     PROCEDURE DATE:  11/10/2023          INTERPRETATION:  CLINICAL STATEMENT: Caustic ingestion. Drank bleach.   Chest pain. Vomiting.    TECHNIQUE: Contiguous axial CT images were obtained from the thoracic   inlet to the pubic symphysis following administration of 95 cc Omnipaque   350 intravenous contrast.  Oral contrast was not administered.   Reformatted images in the coronal and sagittal planes were acquired. 3D   (MIP) images obtained. 5 cc contrast discarded.    COMPARISON: None.      FINDINGS:    CHEST:    LUNGS, PLEURA, AIRWAYS: No lobar consolidation, mass, effusion, or   pneumothorax. No evidence of central endobronchial obstruction. No   bronchiectasis or honeycombing. No suspicious pulmonary nodule.    THORACIC NODES: No mediastinal, hilar, supraclavicular, or axillary   lymphadenopathy. No pneumomediastinum.    MEDIASTINUM/GREAT VESSELS: No pericardial effusion. Heart size is within   normal limits. The aorta and main pulmonary artery are of normal caliber.    ABDOMEN/PELVIS:    HEPATOBILIARY: Unremarkable.    SPLEEN: Splenic cysts    PANCREAS: Unremarkable.    ADRENAL GLANDS: Unremarkable.    KIDNEYS: Symmetric pattern of renal enhancement. No hydronephrosis   bilaterally.    ABDOMINOPELVIC NODES: No lymphadenopathy.    PELVIC ORGANS: Unremarkable.    PERITONEUM/MESENTERY/BOWEL: No bowel obstruction. No pneumoperitoneum.   Trace free pelvic fluid, likely physiologic.    BONES/SOFT TISSUES: Unremarkable.      IMPRESSION:  No evidence of acute intrathoracic or abdominal pathology.    --- End of Report ---    GHASSAN MCCURDY MD; Attending Radiologist  This document has been electronically signed. Nov 10 2023 12:03AM (11-10-23 @ 00:00)       Gastroenterology Consultation:    Patient is a 23y old  Female who presents with a chief complaint of Suicide attempt (10 Nov 2023 08:15)        Admitted on: 11-10-23      HPI:   23-year-old female with no pertinent past medical history presenting for evaluation status post ingestion of 5 to 10 ounces of bleach yesterday as a  suicide attempt.  Patient denies any visual or auditory hallucinations, homicidal ideations.  Patient is complaining of burning  painfrom the throat down the esophagus into her chest. Denies any breathing difficulty or stridor. The pain is worse when she swallows.  Pt denies coughing up blood but she did vomit earlier. According to the patient she attempted suicide because her social security was refused 2 days ago and she was stressed about it.       Prior EGD: never had any    Prior Colonoscopy: never had any      PAST MEDICAL & SURGICAL HISTORY:  No pertinent past medical history      No significant past surgical history    FAMILY HISTORY:  No pertinent family history in first degree relatives        Social History:  Tobacco: denies  Alcohol: denies  Drugs: denies    Home Medications:  acetaminophen 325 mg oral tablet: 3 tab(s) orally every 6 hours (11 May 2023 06:31)  benzocaine 20% topical spray: 1 Apply topically to affected area every 6 hours As needed for Perineal discomfort (11 May 2023 06:31)  ibuprofen 600 mg oral tablet: 1 tab(s) orally every 6 hours (11 May 2023 06:31)  magnesium hydroxide 8% oral suspension: 30 milliliter(s) orally 2 times a day As needed Constipation (11 May 2023 06:31)  Prenatal Multivitamins with Folic Acid 1 mg oral tablet: 1 tab(s) orally once a day (11 May 2023 06:31)  simethicone 80 mg oral tablet, chewable: 1 tab(s) orally every 4 hours As needed Gas (11 May 2023 06:31)        MEDICATIONS  (STANDING):  chlorhexidine 2% Cloths 1 Application(s) Topical <User Schedule>  enoxaparin Injectable 40 milliGRAM(s) SubCutaneous every 24 hours  lactated ringers. 1000 milliLiter(s) (75 mL/Hr) IV Continuous <Continuous>  pantoprazole  Injectable 40 milliGRAM(s) IV Push two times a day    MEDICATIONS  (PRN):      Allergies  No Known Allergies      Review of Systems:   Constitutional:  No Fever, No Chills  ENT/Mouth:  No Hearing Changes,  No Difficulty Swallowing  Eyes:  No Eye Pain, No Vision Changes  Cardiovascular:  No Chest Pain, No Palpitations  Respiratory:  No Cough, No Dyspnea  Gastrointestinal:  As described in HPI          Physical Examination:  T(C): 36.4 (11-10-23 @ 08:00), Max: 37.1 (11-09-23 @ 22:41)  HR: 58 (11-10-23 @ 10:00) (49 - 100)  BP: 104/59 (11-10-23 @ 10:00) (98/54 - 154/91)  RR: 18 (11-10-23 @ 10:00) (14 - 21)  SpO2: 98% (11-10-23 @ 10:00) (97% - 100%)  Height (cm): 173 (11-10-23 @ 10:39)  Weight (kg): 66.5 (11-10-23 @ 10:39)    11-09-23 @ 07:01  -  11-10-23 @ 07:00  --------------------------------------------------------  IN: 350 mL / OUT: 400 mL / NET: -50 mL          GENERAL: AAOx3, no acute distress.  HEAD:  Atraumatic, Normocephalic  EYES: conjunctiva and sclera clear  CHEST/LUNG: Clear to auscultation bilaterally; No wheeze, rhonchi, or rales  HEART: Regular rate and rhythm; normal S1, S2, No murmurs.  ABDOMEN: Soft, nontender, nondistended; Bowel sounds present  SKIN: Intact, no jaundice        Data:                        11.1   7.87  )-----------( 203      ( 10 Nov 2023 04:45 )             32.6     Hgb Trend:  11.1  11-10-23 @ 04:45  13.2  11-09-23 @ 21:57        11-10    143  |  111<H>  |  6<L>  ----------------------------<  90  3.9   |  24  |  0.6<L>    Ca    8.9      10 Nov 2023 04:45  Mg     1.8     11-10    TPro  5.5<L>  /  Alb  4.0  /  TBili  1.1  /  DBili  x   /  AST  13  /  ALT  8   /  AlkPhos  81  11-10    Liver panel trend:  TBili 1.1   /   AST 13   /   ALT 8   /   AlkP 81   /   Tptn 5.5   /   Alb 4.0    /   DBili --      11-10  TBili 0.8   /   AST 16   /   ALT 11   /   AlkP 98   /   Tptn 7.0   /   Alb 4.7    /   DBili --      11-09              Radiology:  CT Abdomen and Pelvis w/ IV Cont:   ACC: [id number] EXAM:  CT CHEST IC   ORDERED BY: DIANE TALBOT     ACC: [id number] EXAM:  CT ABDOMEN AND PELVIS IC   ORDERED BY: DIANE TALBOT     PROCEDURE DATE:  11/10/2023          INTERPRETATION:  CLINICAL STATEMENT: Caustic ingestion. Drank bleach.   Chest pain. Vomiting.    TECHNIQUE: Contiguous axial CT images were obtained from the thoracic   inlet to the pubic symphysis following administration of 95 cc Omnipaque   350 intravenous contrast.  Oral contrast was not administered.   Reformatted images in the coronal and sagittal planes were acquired. 3D   (MIP) images obtained. 5 cc contrast discarded.    COMPARISON: None.      FINDINGS:    CHEST:    LUNGS, PLEURA, AIRWAYS: No lobar consolidation, mass, effusion, or   pneumothorax. No evidence of central endobronchial obstruction. No   bronchiectasis or honeycombing. No suspicious pulmonary nodule.    THORACIC NODES: No mediastinal, hilar, supraclavicular, or axillary   lymphadenopathy. No pneumomediastinum.    MEDIASTINUM/GREAT VESSELS: No pericardial effusion. Heart size is within   normal limits. The aorta and main pulmonary artery are of normal caliber.    ABDOMEN/PELVIS:    HEPATOBILIARY: Unremarkable.    SPLEEN: Splenic cysts    PANCREAS: Unremarkable.    ADRENAL GLANDS: Unremarkable.    KIDNEYS: Symmetric pattern of renal enhancement. No hydronephrosis   bilaterally.    ABDOMINOPELVIC NODES: No lymphadenopathy.    PELVIC ORGANS: Unremarkable.    PERITONEUM/MESENTERY/BOWEL: No bowel obstruction. No pneumoperitoneum.   Trace free pelvic fluid, likely physiologic.    BONES/SOFT TISSUES: Unremarkable.      IMPRESSION:  No evidence of acute intrathoracic or abdominal pathology.    --- End of Report ---    GHASSAN MCCURDY MD; Attending Radiologist  This document has been electronically signed. Nov 10 2023 12:03AM (11-10-23 @ 00:00)

## 2023-11-10 NOTE — CHART NOTE - NSCHARTNOTEFT_GEN_A_CORE
EGD Impressions:  	Normal mucosa in the whole esophagus.  	Erythema in the stomach body and antrum compatible with non-erosive gastritis. (Biopsy).  	Normal mucosa in the 1st and 2nd portion of duodenum.  PLAN:  Start clear liquid diet and advance as tolerated.  Continue PO pantoprazole 40 mg BID for 4 weeks.    Await pathologies.  Psychiatry evaluation.  Rest of management per primary team.   Follow up with our [facility] located at [address] [place] [address]. Phone Number: [phone]    Please call back PRN.    Communicated with primary team

## 2023-11-10 NOTE — PRE-OP CHECKLIST - BSA (M2)
1.79
28-year-old male, past medical history of a  shunt secondary to hydrocephalus, presenting to the emergency room complaining of intermittent episodes left-sided chest pain and tightness for the past few months. Patient is otherwise well-appearing and in no acute distress.  He has mild reproducible pain on exam.  Chest x-ray obtained and does not show any acute pathology.  Labs and EKG were reviewed; he is noted to have a sinus bradycardia rhythm without any evidence of heart block or arrhythmia.  Labs do not show any evidence of ischemia as his troponin is negative and D-dimer is also negative as well.  We will plan to discharge patient to follow-up with the pulmonologist and cardiologist.  Smoking cessation encouraged.  We will also discharged with an albuterol pump to trial for possible symptomatic relief.  Return precautions discussed and patient stable as he leaves.

## 2023-11-10 NOTE — CONSULT NOTE ADULT - ATTENDING COMMENTS
23-year-old female no significant past medical history presents to the ER after having 4 sips of bleach last night.  Status post evaluation by ENT CT scan chest unremarkable schedule EGD for staging.

## 2023-11-10 NOTE — CHART NOTE - NSCHARTNOTEFT_GEN_A_CORE
Patient is a 23y old F w/no pertinent PMH presenting s/p suicide attempt w/5-10 mL bleach ingestion w/pain from throat down her esophagus to stomach w/exacerbation when swallowing and 1 episode of NBNB vomitting, w/ED vitals stable, labs unremarkable, and CXR, CT CAP, and CTA neck unremarkable now admitted to MICU for management of bleach ingestion. Of note, she attempted suicide because her social security was denied 2 days ago and she was stressed as she was a mother of 2 children, one 5 months old and one 3 years old.    # Suicide attempt, no prior attempts  # No hx of underlying depression or post partum psychosis  # Bleach ingestion  *CTA Neck (11/9): No evidence of active bleed. No evidence for saccular aneurysm, vascular malformation, or large vessel occlusion.  *CT CAP IC (11/9): No evidence of acute intrathoracic or abdominal pathology.  *CXR (11/9): No radiographic evidence of acute cardiopulmonary disease.  - Not on ventilation, continue monitoring closely for respiratory distress  - Aspiration precautions  - Toxicology recs (11/10): Obtain routine toxicological labs including CBC, CMP, serum acetaminophen, salicylate, ethanol, and EKG  - F/u serum acetaminophen, salicylate, ethanol, and EKG  - F/u serum osm  - S/p EGD 11/10  - Start clear liquid diet, advance as tolerated  - Off protonix gtt, c/w protonix PO bid  - C/w LR @ 75 cc/hr  - F/u psych eval  - Appreciate GI, ENT, and toxicology recs    #MISC  - DVT PPx: Not indicated  - GI PPx: Protonix drip  - Diet: Clear liquid diet, advance as tolerated  - Activity: Increase as tolerated  - Lines: Peripheral IV  - Drips: None  - Dispo: DGTF pending EGD    Handoff: F/u psych eval, advance diet as tolerated, GI following

## 2023-11-10 NOTE — H&P ADULT - ASSESSMENT
IMPRESSION:    # suicide attempt   # No hx of underlying depression or post partum psychosis  # No prior attempts  # No significant medical hx   # Not intubated    PLAN:    CNS: - Avoid CNS Depressants, CT head negative    HEENT: Oral care. Aspiration precautions, no apparent oral ulcers, Lips dry, the oral pharynx is erythematous without any lesions noted.  Pt was not able to tolerate a FFL thru the nares so an oral pharyngeal examination was done.  The upper larynx was erythematous without edema.  The vocal cords appeared to be mobile.  The airway was grossly patent.    PULMONARY: HOB @ 45. Monitor Pulse Ox. Keep > 93%. Supplement as needed.    CARDIOVASCULAR: - Daily Weight. Strict I&Os. Keep I < O    GI: GI prophylaxis. NPO for now, GI planning for EGD tomorrow c/w PPI drip for now,     RENAL: Avoid nephrotoxic agents     INFECTIOUS DISEASE: monitor off antibiotics, no signs of infection    HEMATOLOGICAL: DVT prophylaxis not needed,     ENDOCRINE: Monitor FS    MUSCULOSKELETAL: Ambulate as tolerated     CODE STATUS: Full    DISPOSITION: MICU

## 2023-11-10 NOTE — H&P ADULT - NSHPPHYSICALEXAM_GEN_ALL_CORE
GENERAL: NAD, lying in bed comfortably  HEAD:  Atraumatic, Normocephalic  EYES: conjunctiva and sclera clear  ENT: Lips dry, the oral pharynx is erythematous without any lesions noted.  Pt was not able to tolerate a FFL thru the nares so an oral pharyngeal examination was done.  The upper larynx was erythematous without edema.  The vocal cords appeared to be mobile.  The airway was grossly patent.  NECK: Supple, No JVD  CHEST/LUNG: Clear to auscultation bilaterally; No rales, rhonchi, wheezing, or rubs. Unlabored respirations  HEART: Regular rate and rhythm; No murmurs, rubs, or gallops  ABDOMEN: Soft, nontender, nondistended  EXTREMITIES:  No clubbing, cyanosis, or edema  NERVOUS SYSTEM:  A&Ox3

## 2023-11-10 NOTE — CONSULT NOTE ADULT - SUBJECTIVE AND OBJECTIVE BOX
Patient is a 23y old  Female who presents with a chief complaint of Suicide attempt (10 Nov 2023 05:42)      HPI:  Patient is a 23-year-old female with no pertinent past medical history presenting for evaluation status post ingestion of 5 to 10 ounces of bleach shortly prior to arrival.  This was a suicide attempt.  Patient denies any visual or auditory hallucinations, homicidal ideations.  Per EMS patient told her  that she drank this and he called EMS. According to pt  at bedside( serving for continuous interpretation) Patient is complaining of pain traveling from the throat down the esophagus into her chest. Pt states the pain in her throat and chest travels to her stomach and is severe but she denies any breathing difficulty or stridor. The pain is worse when she swallows.  Pt denies coughing up blood but she did vomit earlier. According to the patient she attempted suicide because her social security was refused 2 days ago and she was stressed about it. She is mother of 2 children one is 5 months and another 3 years old. She denied any post partum depression or psychosis. She mentioned that she has never been diagnosed with depression and never had thoughts of harming herself besides the recent stressor in life.     ICU Vital Signs Last 24 Hrs  T(C): 37.1 (09 Nov 2023 22:41), Max: 37.1 (09 Nov 2023 22:41)  T(F): 98.8 (09 Nov 2023 22:41), Max: 98.8 (09 Nov 2023 22:41)  HR: 55 (10 Nov 2023 01:41) (54 - 100)  BP: 98/54 (10 Nov 2023 01:41) (98/54 - 154/91)  BP(mean): 69 (10 Nov 2023 01:41) (69 - 80)  RR: 20 (10 Nov 2023 01:41) (20 - 20)  SpO2: 100% (10 Nov 2023 01:41) (99% - 100%)  O2 Parameters below as of 10 Nov 2023 01:41  Patient On (Oxygen Delivery Method): nasal cannula  O2 Flow (L/min): 2       (10 Nov 2023 02:45)      PAST MEDICAL & SURGICAL HISTORY:  No pertinent past medical history      No significant past surgical history          SOCIAL HX:   Smoking                         ETOH                            Other    FAMILY HISTORY:  No pertinent family history in first degree relatives    :  No known cardiovacular family hisotry     Review Of Systems:     All ROS are negative except per HPI       Allergies    No Known Allergies    Intolerances          PHYSICAL EXAM    ICU Vital Signs Last 24 Hrs  T(C): 36.7 (10 Nov 2023 03:30), Max: 37.1 (09 Nov 2023 22:41)  T(F): 98 (10 Nov 2023 03:30), Max: 98.8 (09 Nov 2023 22:41)  HR: 55 (10 Nov 2023 07:00) (49 - 100)  BP: 107/57 (10 Nov 2023 07:00) (98/54 - 154/91)  BP(mean): 77 (10 Nov 2023 07:00) (69 - 93)  ABP: --  ABP(mean): --  RR: 16 (10 Nov 2023 07:00) (14 - 20)  SpO2: 98% (10 Nov 2023 07:00) (97% - 100%)    O2 Parameters below as of 10 Nov 2023 06:00  Patient On (Oxygen Delivery Method): nasal cannula  O2 Flow (L/min): 2          CONSTITUTIONAL:  NAD    ENT:   Airway patent,   Mouth with normal mucosa.   No thrush      CARDIAC:   Normal rate,   Regular rhythm.    No edema    RESPIRATORY:   No wheezing  Bilateral BS   Not tachypneic,  No use of accessory muscles    GASTROINTESTINAL:  Abdomen soft,   Non-tender,   No guarding,   + BS      NEUROLOGICAL:   Alert and oriented   No motor deficits.    SKIN:   Skin normal color for race,   No evidence of rash.          11-09-23 @ 07:01  -  11-10-23 @ 07:00  --------------------------------------------------------  IN:    Lactated Ringers: 300 mL    Pantoprazole: 50 mL  Total IN: 350 mL    OUT:    Voided (mL): 400 mL  Total OUT: 400 mL    Total NET: -50 mL          LABS:                          11.1   7.87  )-----------( 203      ( 10 Nov 2023 04:45 )             32.6                                               11-10    143  |  111<H>  |  6<L>  ----------------------------<  90  3.9   |  24  |  0.6<L>    Ca    8.9      10 Nov 2023 04:45  Mg     1.8     11-10    TPro  5.5<L>  /  Alb  4.0  /  TBili  1.1  /  DBili  x   /  AST  13  /  ALT  8   /  AlkPhos  81  11-10                                             Urinalysis Basic - ( 10 Nov 2023 04:45 )    Color: x / Appearance: x / SG: x / pH: x  Gluc: 90 mg/dL / Ketone: x  / Bili: x / Urobili: x   Blood: x / Protein: x / Nitrite: x   Leuk Esterase: x / RBC: x / WBC x   Sq Epi: x / Non Sq Epi: x / Bacteria: x                                                  LIVER FUNCTIONS - ( 10 Nov 2023 04:45 )  Alb: 4.0 g/dL / Pro: 5.5 g/dL / ALK PHOS: 81 U/L / ALT: 8 U/L / AST: 13 U/L / GGT: x                                                                                                                                       X-Rays reviewed                                                                                     ECHO        MEDICATIONS  (STANDING):  chlorhexidine 2% Cloths 1 Application(s) Topical <User Schedule>  lactated ringers. 1000 milliLiter(s) (75 mL/Hr) IV Continuous <Continuous>  pantoprazole Infusion 8 mG/Hr (10 mL/Hr) IV Continuous <Continuous>    MEDICATIONS  (PRN):

## 2023-11-10 NOTE — CONSULT NOTE ADULT - ASSESSMENT
IMPRESSION:    Suicidal attempt  Alkali ingestion     PLAN:    CNS:  no depressants.  psych evaluation.  1:1  Drug screen.  Tosx evaluation appreciated     HEENT: Oral care.  ENT eval appreciated.        PULMONARY:  HOB @ 45 degrees.  Aspiration precautions.  On RA.      CARDIOVASCULAR:  Gentle hydration while NPO.      GI: GI prophylaxis.  NPO.  PPI BID.  GI for possible endoscopy today.       RENAL:  Follow up lytes.  Correct as needed    INFECTIOUS DISEASE: Follow up cultures.  Monitor VS.  Monitor OFf ABX for now     HEMATOLOGICAL:  DVT prophylaxis.    ENDOCRINE:  Follow up FS.      MUSCULOSKELETAL:  OOB to cahir     Possible DGTF PM         
Ingestion of Bleach- Evaluation of airway
 23-year-old female with no pertinent past medical history presenting for evaluation status post ingestion of 5 to 10 ounces of bleach yesterday as a  suicide attempt.    #Caustic ingestion   - s/p CT chest/neck/abdomen : no perforation or mural necrosis  - 5-10 ounces of bleach ingestion as a part of suicide attempt  - s/p ENT eval no edema, no airway compromise  - on PPI drip    PLAN  - keep NPO  - scheduled for EGD  - c/w PPI   - monitor electrolytes, VBG  - psychiatry consult
Caustics are generally acidic or alkali chemicals that can cause direct mucocutaneous and esophagogastric injury of varying degrees. In terms of ingestion, symptoms of emesis, abdominal pain, drooling, and stridor are predictors of more severe injury. Visible lesions on the cheeks, lips, or in the oropharynx are also predictors of severe injury. Intentional, large volume ingestions, and/or acidic chemicals are also associated with more severe injury. Complications include necrosis, stricture formation, and/or perforation.    Recommendations  - Supportive care, monitor for signs of drooling, airway collapse, stridor, intractable vomiting.   - Recommend CT Neck, Chest, Abdomen to rule out perforation  - Obtain GI consult to evaluate further for possible endoscopy  - Obtain routine toxicological labs including CBC, CMP, serum acetaminophen, salicylate, ethanol, and EKG  - Further medical and/or psychiatric care per primary team    Thank you for involving us in the care of this patient. Assessment and plan discussed with toxicology attending Dr. Jose G Davis. Please do not hesitate to reach out to the toxicology team for any further questions or concerns.    The On-Call Toxicology Fellow can be reached 24/7 via Pager #[phone]  Please send a 10 digit call back # as [facility] cover multiple hospitals    Brett Davis MD  Toxicology Fellow  PGY-4

## 2023-11-10 NOTE — PATIENT PROFILE ADULT - FALL HARM RISK - PATIENT NEEDS ASSISTANCE
[facility] Medicine Daily Progress Note    Date of Service  10/19/2023    Chief Complaint  Sharon Guardado is a 71 y.o. male admitted 9/24/2023 with AMS    Hospital Course  71-year-old male with a past medical history of alcohol abuse, hypertension, obesity who presented with altered mental status and a ground-level fall on 9/24/2023.  On admission he was noted to have a small frontal subarachnoid hemorrhage on CT head.  He was also noted to have severe sepsis secondary to UTI.  She was treated with IV ceftriaxone.  Patient developed symptoms of alcohol withdrawal and was intubated.  Patient was subsequently extubated and transferred to telemetry on 9/30.  The patient subsequently had worsening mental status, on 9/30 found to have a left frontal lobe hemorrhage with surrounding vasogenic edema with mild mass effect, deemed nonoperative by neurosurgery  Was readmitted to the ICU level of care, intubated, treated with hypertonic saline, empirically treated with Unasyn for fever, overall state on mechanical ventilation for 9 days, during the course there was a concern for possible abdominal infection, gallbladder infection, surgery was consulted,  HIDA scan was obtained and was negative for acute cholecystitis, surgery not indicated as per general surgery opinion.  The patient found with significant right-sided weakness, aphasia.  On 10/10 patient again found appropriate to transfer out of ICU.    Very slow improvement. Awake, not following commands for me, no communication/interaction with me, still aphasic, R weakness. Therapy note on 10/13 notes he was sitting up in bed with their help and even tried brushing his teeth.     EKG showed sinus bradycardia  PEG tube  placement pending.  Lantus titrated to 50 units qhs.    RUE swelling - US  1. No deep venous thrombosis. 2. Acute to subacute, occlusive thrombus in the median antecubital vein. Try supportive care initially for SVT treatment with elevation, warm  compress, etc.     Interval Problem Update  10/17  Not following, not responding. Discussed with wife at bedside, was more active on Friday-sat, OT note from 10/13 that states he was sitting up in bed with OT help and was even trying to brush his teeth  PEG tube planned for tomorrow. Discharge planning ongoing. Vitals stable.     10/18  Unfortunately PEG tube delayed again, set for tomorrow. No significant clinical change. SBP improved today. Likely SNF on discharge given failure to make any significant improvement.    10/19  I consulted Dr. Lopez from general surgery who has kindly accepted to take patient for PEG tube insertion. Went for PEG tube placement, tolerated well no complications. Can now be discharged to rehab, discharge planning ongoing.    I have discussed this patient's plan of care and discharge plan at IDT rounds today with Case Management, Nursing, Nursing leadership, and other members of the IDT team.    Code Status  Full Code    Disposition    I have placed the appropriate orders for post-discharge needs.    Review of Systems  Review of Systems   Unable to perform ROS: Acuity of condition        Physical Exam  Temp:  [36.2 °C (97.1 °F)-36.7 °C (98.1 °F)] 36.7 °C (98.1 °F)  Pulse:  [] 96  Resp:  [14-20] 19  BP: (115-168)/(69-94) 159/89  SpO2:  [91 %-96 %] 92 %    Physical Exam  Constitutional:       Appearance: He is obese.      Comments: Awake, not responding to commands, moves left upper and lower   HENT:      Head: Normocephalic.      Mouth/Throat:      Mouth: Mucous membranes are dry.      Pharynx: No posterior oropharyngeal erythema.   Eyes:      Extraocular Movements: Extraocular movements intact.      Conjunctiva/sclera: Conjunctivae normal.   Cardiovascular:      Rate and Rhythm: Normal rate and regular rhythm.      Pulses: Normal pulses.      Heart sounds: Normal heart sounds.   Pulmonary:      Effort: Pulmonary effort is normal.      Breath sounds: Normal breath sounds.    Abdominal:      General: Bowel sounds are normal.      Palpations: Abdomen is soft.      Tenderness: There is no abdominal tenderness. There is no guarding.   Musculoskeletal:         General: No swelling or tenderness.      Cervical back: Normal range of motion and neck supple.   Skin:     General: Skin is warm and dry.      Coloration: Skin is not jaundiced.   Neurological:      Comments: Aphasia, R weakness, not following commands, moving left side spontaneous         Fluids    Intake/Output Summary (Last 24 hours) at 10/20/2023 0442  Last data filed at 10/19/2023 2000  Gross per 24 hour   Intake 0 ml   Output --   Net 0 ml         Laboratory                  Recent Labs     10/17/23  0945   INR 1.10                 Imaging  US-EXTREMITY VENOUS UPPER UNILAT RIGHT   Final Result      DX-ABDOMEN FOR TUBE PLACEMENT   Final Result      Nasogastric tube tip overlies the duodenal bulb      DX-ABDOMEN FOR TUBE PLACEMENT   Final Result      1.  Enteric tube tip again projects over the 2nd portion of the duodenum.      DX-ABDOMEN FOR TUBE PLACEMENT   Final Result      Enteric tube tip projects over the second portion of the duodenum      DX-CHEST-PORTABLE (1 VIEW)   Final Result      Unchanged bibasilar underinflation atelectasis      DX-ABDOMEN FOR TUBE PLACEMENT   Final Result      Enteric tube tip projects over the gastric antrum or proximal duodenum      DX-CHEST-PORTABLE (1 VIEW)   Final Result      Improving bibasilar pulmonary opacities.      DX-CHEST-PORTABLE (1 VIEW)   Final Result         Findings on chest radiograph appear stable since the prior radiograph.  No new abnormalities are identified.      NM-HEPATOBILIARY SCAN   Final Result      There is visualization of the gallbladder after morphine administration, indicating the cystic duct is still patent. No scintigraphy evidence of acute cholecystitis.      DX-CHEST-PORTABLE (1 VIEW)   Final Result         1.  Mild pulmonary edema and/or infiltrates.   2.   Cardiomegaly   3.  Atherosclerosis      DX-CHEST-PORTABLE (1 VIEW)   Final Result         1.  No acute cardiopulmonary disease.   2.  Cardiomegaly   3.  Atherosclerosis      US-RUQ   Final Result      1.  Minimal gallbladder wall thickening and possible sludge.  Cholecystitis is not excluded.   2.  Minimal pericholecystic fluid may be due to gallbladder inflammation or liver disease.   3.  Enlarged echogenic liver suggesting fatty infiltration.   4.  Limited evaluation of the pancreas abdominal aorta.      DX-CHEST-PORTABLE (1 VIEW)   Final Result         1.  Pulmonary edema and/or infiltrates are identified, which are stable since the prior exam.   2.  Cardiomegaly   3.  Atherosclerosis      US-EXTREMITY VENOUS UPPER UNILAT RIGHT   Final Result      DX-CHEST-PORTABLE (1 VIEW)   Final Result         1.  Pulmonary edema and/or infiltrates are identified, which are stable since the prior exam.   2.  Cardiomegaly   3.  Atherosclerosis      DX-CHEST-PORTABLE (1 VIEW)   Final Result         1.  Pulmonary edema and/or infiltrates are identified, which are stable since the prior exam.   2.  Cardiomegaly   3.  Atherosclerosis      DX-CHEST-PORTABLE (1 VIEW)   Final Result         1.  Pulmonary edema and/or infiltrates are identified, which are somewhat decreased since the prior exam.   2.  Cardiomegaly   3.  Atherosclerosis      MR-MRA HEAD-W/O   Final Result         Normal intracranial MRA.      MR-BRAIN-WITH & W/O   Final Result         Left frontal lobe hemorrhage with surrounding vasogenic edema and mild mass effect upon the frontal horn of the left lateral ventricle. There is approximately 2 mm of midline shift to the right.      Age-related volume loss and chronic microvascular ischemic changes.      No abnormal intracranial enhancement is identified.      CT-HEAD W/O   Final Result         1. Stable 6 cm left frontal intraparenchymal hemorrhage with mild, 2 mm left-to-right subfalcine herniation.   2. No new  abnormality or change.         DX-CHEST-PORTABLE (1 VIEW)   Final Result         1.  Pulmonary edema and/or infiltrates are identified, which appear somewhat increased since the prior exam.   2.  Cardiomegaly   3.  Atherosclerosis      DX-ABDOMEN FOR TUBE PLACEMENT   Final Result      NG tube extends below the diaphragm and appears to extend through the region of the stomach with tip at the level of the proximal duodenum.      DX-CHEST-PORTABLE (1 VIEW)   Final Result      1.  Grossly satisfactory appearance of tubes and lines.   2.  No evidence of pneumothorax.   3.  Marked hypoinflation with probable bibasilar atelectasis.   4.  Enlarged cardiac silhouette with vascular congestion.      CT-HEAD W/O   Final Result         1.  New large parenchymal hemorrhage involving the frontal lobe is identified with some surrounding edema.      2.  Subarachnoid hemorrhage is also noted in the area.      3.  Minimal midline shift to the right side.      Findings are consistent with atrophy.  Decreased attenuation in the periventricular white matter likely indicates microvascular ischemic disease.      Based solely on CT findings, the brain injury guideline category is mBIG 3.      EDH   IVH   Displaced skull fx   SDH > 8mm   IPH > 8mm or multiple   SAH bi-hemispheric or > 3mm      The original BIG retrospective analysis found radiographic progression in 0% of BIG 1 patients and 2.6% BIG 2.      These findings were discussed with GEETA MOLINA on 09/30/2023.      DX-CHEST-PORTABLE (1 VIEW)   Final Result      Stable multiple pulmonary vascular dilation/congestion. Otherwise, negative.      DX-CHEST-PORTABLE (1 VIEW)   Final Result      1.  Increasing airspace disease at the left base.      DX-CHEST-LIMITED (1 VIEW)   Final Result         1.  Pulmonary edema and/or infiltrates are identified, which are somewhat decreased since the prior exam.   2.  Cardiomegaly   3.  Atherosclerosis      DX-CHEST-PORTABLE (1 VIEW)   Final Result          1.  Pulmonary edema and/or infiltrates are identified, which appear somewhat increased since the prior exam.   2.  Cardiomegaly   3.  Atherosclerosis      DX-CHEST-PORTABLE (1 VIEW)   Final Result         1.  Interstitial edema and/or infiltrates, slightly decreased since prior study.   2.  Cardiomegaly   3.  Atherosclerosis      DX-CHEST-LIMITED (1 VIEW)   Final Result      Right IJ catheter tip projects in the SVC. No pneumothorax.      CT-CTA NECK WITH & W/O-POST PROCESSING   Final Result      1.  Estimated 50-75% stenosis of the BILATERAL carotid arteries   2.  Estimated greater than 50% stenosis at the vertebral artery ostium on each side      CT-CTA HEAD WITH & W/O-POST PROCESS   Final Result      1.  No large vessel occlusion or aneurysm.   2.  Hazy left frontal lobe subarachnoid hemorrhage, decreased in conspicuity from prior. No new hemorrhage seen.   3.  Scattered opacifications of the ethmoid sinuses, likely related to support hardware.      DX-ABDOMEN FOR TUBE PLACEMENT   Final Result         1.  Nonspecific bowel gas pattern in the upper abdomen.   2.  Dobbhoff tube with tip terminating overlying the expected location of the first or second duodenal segment.      DX-CHEST-PORTABLE (1 VIEW)   Final Result         1.  Interstitial edema and/or infiltrates.   2.  Cardiomegaly   3.  Nasogastric tube terminates in the distal esophagus, recommend advancement   4.  Atherosclerosis      DX-ABDOMEN FOR TUBE PLACEMENT   Final Result         1.  Nonspecific bowel gas pattern in the upper abdomen.   2.  Nasogastric tube terminates just distal to the gastroesophageal junction, recommend advancement      CT-RENAL COLIC EVALUATION(A/P W/O)   Final Result         1.  Intrahepatic biliary air and nondependent air within the gallbladder, consider history of instrumentation or changes of cholangitis in the appropriate clinical setting.   2.  Large fat-containing bilateral inguinal hernias   3.  Enlarged prostate,  consider causes of prostate enlargement with additional workup as clinically appropriate   4.  Diverticulosis   5.  Cholelithiasis   6.  Atherosclerosis      CT-HEAD W/O   Final Result         1.  Hazy left frontal subarachnoid hemorrhage.   2.  Nonspecific white matter changes, commonly associated with small vessel ischemic disease.  Associated mild cerebral atrophy is noted.      Based solely on CT findings, the brain injury guideline category is mBIG 1.      SDH < 4mm   IPH < 4mm   SAH < 3 sulci and < 1mm      The original BIG retrospective analysis found radiographic progression in 0% of BIG 1 patients and 2.6% BIG 2.      These findings were discussed with the patient's clinician, Madhu Zambrano, on 9/24/2023 10:10 PM.      DX-CHEST-PORTABLE (1 VIEW)   Final Result      No acute cardiopulmonary abnormality identified.      IR-GASTROSTOMY PLACEMENT    (Results Pending)        Assessment/Plan  * Intraparenchymal hemorrhage of brain (HCC)- (present on admission)  Assessment & Plan  CT on 9/24 with hazy left frontal SAH  CT on 9/30 with new large IPH in the left frontal lobe  Strict blood pressure control with goal SBP less than 160  Goal sodium 145-150 - I am titrating hypertonic saline to achieve sodium goal  Will deescalate HTS today and slowly correct  Neuro checks every 2 hours    Bradycardia  Assessment & Plan  Telemetry reported bradycardia HR 40s for 4 minutes, asymptomatic  I ordered EKG, mag, Phos, and TSH  EKG showed sinus bradycardia    Continue telemetry monitor  Avoid medications that can further slow down the heart rate    ACP (advance care planning)  Assessment & Plan  10/12 I discussed advance care planning with the patient's wife at bedside, including diagnosis, prognosis, plan of care, risks and benefits of any therapies that could be offered.  We discussed regarding CODE STATUS, CPR and intubation with mechanical ventilation, discussed regarding risk and benefits, as well as alternatives  including palliation and hospice as appropriate.  After long discussion with patient, patient's wife has decided for DNR/DNI, patient CODE STATUS has een updated in epic with DNR/DNI as per patient's wishes.   Guarded recovery, I consulted palliative care. Will decide PEG tube placement depends on the goal of care  ACP 22min  10/13 patient is more alert and awake. The wife discussed with the other family members yesterday and wanted to change the code status back to full code. No pallitive care at this point. The wife agrees with PEG tube placement if patients fails SLP evelu again. I changed the code status back to full code. ACP 16min      Atelectasis- (present on admission)  Assessment & Plan  Patient unable to follow commands for incentive spirometry    Pneumonia due to infectious organism- (present on admission)  Assessment & Plan  Continue Zosyn    Acute respiratory failure with hypoxia (HCC)- (present on admission)  Assessment & Plan  Intubated 9/25-9/28  Reintubated 9/30-10/7  Continue oxygen    Thrombocytopenia (HCC)- (present on admission)  Assessment & Plan  Baseline ~110-125  Monitor, currently improved    Pneumobilia- (present on admission)  Assessment & Plan  As seen on CT renal (9/25/23).    9/26/23: No need for further interventions at this time per gen surg    Type 2 diabetes mellitus (HCC)- (present on admission)  Assessment & Plan  A1c 5.7% (6/2023)  Ongoing treatment with long and short acting insulin, adjust as indicated    10/14 BG high, I increase the Lantus from 39 units to 42 units   Continue to monitor  Hypoglycemia protocol    Primary hypertension- (present on admission)  Assessment & Plan  Hypertensive at presentation   Goal for normotension currently multimodality treatment      BMI 40.0-44.9, adult (HCC)- (present on admission)  Assessment & Plan  Future weight loss recommended    Alcohol dependence (HCC)- (present on admission)  Assessment & Plan  Multivitamin, folate,  thiamine  Monitor, additional therapies as clinically indicated  9/29/2023 patient was at least 2-3 hard liquor drinks daily although this is unclear per patient and his wife's history.  .    Patient s/p treatment for acute alcohol withdrawal         VTE prophylaxis: scd    I have performed a physical exam and reviewed and updated ROS and Plan today (10/19/2023). In review of yesterday's note (10/18/2023), there are no changes except as documented above.    Total time spent 52 minutes. I spent greater than 50% of the time for patient care, counseling, and coordination on this date, including unit/floor time, and face-to-face time with the patient as per interval events, my own review of patient's imaging and lab analysis and developing my assessment and plan above.    I certify that the patient requires continued medically necessary hospital services for the treatment of intraparenchymal hemorrhage, encephalopathy, debility and will remain in the hospital for 7 days.  Discharge plan is anticipated to be 10/24/23.       Standing/Walking/Toileting

## 2023-11-10 NOTE — H&P ADULT - NSHPLABSRESULTS_GEN_ALL_CORE
13.2   8.74  )-----------( 226      ( 09 Nov 2023 21:57 )             38.3     11-09    140  |  105  |  8<L>  ----------------------------<  97  3.6   |  21  |  0.6<L>    Ca    9.3      09 Nov 2023 21:57    TPro  7.0  /  Alb  4.7  /  TBili  0.8  /  DBili  x   /  AST  16  /  ALT  11  /  AlkPhos  98  11-09          Urinalysis Basic - ( 09 Nov 2023 21:57 )    Color: x / Appearance: x / SG: x / pH: x  Gluc: 97 mg/dL / Ketone: x  / Bili: x / Urobili: x   Blood: x / Protein: x / Nitrite: x   Leuk Esterase: x / RBC: x / WBC x   Sq Epi: x / Non Sq Epi: x / Bacteria: x        Lactate Trend        CAPILLARY BLOOD GLUCOSE

## 2023-11-10 NOTE — CHART NOTE - NSCHARTNOTEFT_GEN_A_CORE
PACU ANESTHESIA ADMISSION NOTE      Procedure:   Post op diagnosis:      ____  Intubated  TV:______       Rate: ______      FiO2: ______    __x__  Patent Airway    __x__  Full return of protective reflexes    __x__  Full recovery from anesthesia / back to baseline     Vitals:   T: 37          R:  20                BP:  115/57                Sat:  99                 P: 55      Mental Status:  __x__ Awake   ___x__ Alert   _____ Drowsy   _____ Sedated    Nausea/Vomiting:  _x___ NO  ______Yes,   See Post - Op Orders          Pain Scale (0-10):  _____    Treatment: __x__ None    ____ See Post - Op/PCA Orders    Post - Operative Fluids:   ____ Oral   ___x_ See Post - Op Orders    Plan: Discharge:   __x__Home       _____Floor     _____Critical Care    _____  Other:_________________    Comments:    Uneventful anesthesia. Patient transported to  spontaneously breathing and hemodynamically stable.

## 2023-11-10 NOTE — H&P ADULT - HISTORY OF PRESENT ILLNESS
Patient is a 23-year-old female with no pertinent past medical history presenting for evaluation status post ingestion of 5 to 10 ounces of bleach shortly prior to arrival.  This was a suicide attempt.  Patient denies any visual or auditory hallucinations, homicidal ideations.  Per EMS patient told her  that she drank this and he called EMS. According to pt  at bedside( serving for continuous interpretation) Patient is complaining of pain traveling from the throat down the esophagus into her chest. Pt states the pain in her throat and chest travels to her stomach and is severe but she denies any breathing difficulty or stridor. The pain is worse when she swallows.  Pt denies coughing up blood but she did vomit earlier. According to the patient she attempted suicide because her social security was refused 2 days ago and she was stressed about it. She is mother of 2 children one is 5 months and another 3 years old. She denied any post partum depression or psychosis. She mentioned that she has never been diagnosed with depression and never had thoughts of harming herself besides the recent stressor in life.     ICU Vital Signs Last 24 Hrs  T(C): 37.1 (09 Nov 2023 22:41), Max: 37.1 (09 Nov 2023 22:41)  T(F): 98.8 (09 Nov 2023 22:41), Max: 98.8 (09 Nov 2023 22:41)  HR: 55 (10 Nov 2023 01:41) (54 - 100)  BP: 98/54 (10 Nov 2023 01:41) (98/54 - 154/91)  BP(mean): 69 (10 Nov 2023 01:41) (69 - 80)  RR: 20 (10 Nov 2023 01:41) (20 - 20)  SpO2: 100% (10 Nov 2023 01:41) (99% - 100%)  O2 Parameters below as of 10 Nov 2023 01:41  Patient On (Oxygen Delivery Method): nasal cannula  O2 Flow (L/min): 2

## 2023-11-10 NOTE — PATIENT PROFILE ADULT - FALL HARM RISK - RISK INTERVENTIONS

## 2023-11-10 NOTE — PROGRESS NOTE ADULT - SUBJECTIVE AND OBJECTIVE BOX
----------Daily Progress Note----------    HISTORY OF PRESENT ILLNESS:  Patient is a 23y old F w/no history of suicide attempt, depression, or suicidal ideation presenting s/p suicide attempt w/5-10 mL bleach ingestion w/pain from throat down her esophagus to stomach w/exacerbation when swallowing and 1 episode of NBNB vomitting, w/ED vitals stable, labs unremarkable, and CXR, CT CAP, and CTA neck unremarkable now admitted to MICU for management of bleach ingestion. Of note, she attempted suicide because her social security was denied 2 days ago and she was stressed as she was a mother of 2 children, one 5 months old and one 3 years old.    Today is hospital day 1.     INTERVAL HOSPITAL COURSE / OVERNIGHT EVENTS:  O/N events:  None    24 hr events:  None    Patient was examined and seen at bedside.     Review of Systems: Otherwise unremarkable     <<<<<PAST MEDICAL & SURGICAL HISTORY>>>>>  No pertinent past medical history    No significant past surgical history      ALLERGIES  No Known Allergies      Home Medications:  acetaminophen 325 mg oral tablet: 3 tab(s) orally every 6 hours (11 May 2023 06:31)  benzocaine 20% topical spray: 1 Apply topically to affected area every 6 hours As needed for Perineal discomfort (11 May 2023 06:31)  ibuprofen 600 mg oral tablet: 1 tab(s) orally every 6 hours (11 May 2023 06:31)  magnesium hydroxide 8% oral suspension: 30 milliliter(s) orally 2 times a day As needed Constipation (11 May 2023 06:31)  Prenatal Multivitamins with Folic Acid 1 mg oral tablet: 1 tab(s) orally once a day (11 May 2023 06:31)  simethicone 80 mg oral tablet, chewable: 1 tab(s) orally every 4 hours As needed Gas (11 May 2023 06:31)        MEDICATIONS  STANDING MEDICATIONS  lactated ringers. 1000 milliLiter(s) IV Continuous <Continuous>  pantoprazole Infusion 8 mG/Hr IV Continuous <Continuous>    PRN MEDICATIONS    VITALS:  T(F): 98.8  HR: 55  BP: 123/67  RR: 17  SpO2: 100%    <<<<<INS/OUTS>>>>>  11-09-23 @ 07:01  -  11-10-23 @ 05:43  --------------------------------------------------------  IN:    Lactated Ringers: 75 mL    Pantoprazole: 20 mL  Total IN: 95 mL    OUT:  Total OUT: 0 mL    Total NET: 95 mL    <<<<<PHYSICAL EXAM>>>>>  GENERAL: Young woman laying in bed in no apparent distress  HEENT: Mucous membranes moist, oropharynx erythematous, no lesions, no edema, no drooling  Neck: Supple, non-tender  PULMONARY: Clear to auscultation bilaterally. No rales, rhonchi, or wheezing. No stridor  CARDIOVASCULAR: RRR, S1/S2, no M/R/G  GASTROINTESTINAL: Soft, non-tender, non-distended, no guarding  SKIN/EXTREMITIES: Warm, 2+ dorsalis pedis pulses, no edema  NEUROLOGIC/MUSCULOSKELETAL: AOx4, grossly moving all extremities, no focal deficits.    <<<<<LABS>>>>>                        11.1   7.87  )-----------( 203      ( 10 Nov 2023 04:45 )             32.6     11-10    143  |  111<H>  |  6<L>  ----------------------------<  90  3.9   |  24  |  0.6<L>    Ca    8.9      10 Nov 2023 04:45  Mg     1.8     11-10    TPro  5.5<L>  /  Alb  4.0  /  TBili  1.1  /  DBili  x   /  AST  13  /  ALT  8   /  AlkPhos  81  11-10      Urinalysis Basic - ( 10 Nov 2023 04:45 )    Color: x / Appearance: x / SG: x / pH: x  Gluc: 90 mg/dL / Ketone: x  / Bili: x / Urobili: x   Blood: x / Protein: x / Nitrite: x   Leuk Esterase: x / RBC: x / WBC x   Sq Epi: x / Non Sq Epi: x / Bacteria: x        Lactate, Blood: 0.7 mmol/L (11-10-23 @ 04:45)    [id number]      <<<<<LINES/TUBES/DRIPS>>>>>  Peripheral Lines    Pantoprazole gtt    <<<<<RADIOLOGY>>>>>  CTA Neck (11/9): No evidence of active bleed. No evidence for saccular aneurysm, vascular malformation, or large vessel occlusion.    CT CAP IC (11/9): No evidence of acute intrathoracic or abdominal pathology.    CXR (11/9): No radiographic evidence of acute cardiopulmonary disease.    ----------------------------------------------------------------------------------------------------------------------------------------------------------------------------------------------- ----------Daily Progress Note----------    HISTORY OF PRESENT ILLNESS:  Patient is a 23y old F w/no history of suicide attempt, depression, or suicidal ideation presenting s/p suicide attempt w/5-10 mL bleach ingestion w/pain from throat down her esophagus to stomach w/exacerbation when swallowing and 1 episode of NBNB vomitting, w/ED vitals stable, labs unremarkable, and CXR, CT CAP, and CTA neck unremarkable now admitted to MICU for management of bleach ingestion. Of note, she attempted suicide because her social security was denied 2 days ago and she was stressed as she was a mother of 2 children, one 5 months old and one 3 years old.    Today is hospital day 1.     INTERVAL HOSPITAL COURSE / OVERNIGHT EVENTS:  O/N events:  None    24 hr events:  None    Patient was examined and seen at bedside. Continues to report throat pain down esophagus to stomach but otherwise denies any other issues, denies any vomiting, has nausea    Review of Systems: Otherwise unremarkable     <<<<<PAST MEDICAL & SURGICAL HISTORY>>>>>  No pertinent past medical history    No significant past surgical history      ALLERGIES  No Known Allergies      Home Medications:  acetaminophen 325 mg oral tablet: 3 tab(s) orally every 6 hours (11 May 2023 06:31)  benzocaine 20% topical spray: 1 Apply topically to affected area every 6 hours As needed for Perineal discomfort (11 May 2023 06:31)  ibuprofen 600 mg oral tablet: 1 tab(s) orally every 6 hours (11 May 2023 06:31)  magnesium hydroxide 8% oral suspension: 30 milliliter(s) orally 2 times a day As needed Constipation (11 May 2023 06:31)  Prenatal Multivitamins with Folic Acid 1 mg oral tablet: 1 tab(s) orally once a day (11 May 2023 06:31)  simethicone 80 mg oral tablet, chewable: 1 tab(s) orally every 4 hours As needed Gas (11 May 2023 06:31)        MEDICATIONS  STANDING MEDICATIONS  lactated ringers. 1000 milliLiter(s) IV Continuous <Continuous>  pantoprazole Infusion 8 mG/Hr IV Continuous <Continuous>    PRN MEDICATIONS    VITALS:  T(F): 98.8  HR: 55  BP: 123/67  RR: 17  SpO2: 100%    <<<<<INS/OUTS>>>>>  11-09-23 @ 07:01  -  11-10-23 @ 05:43  --------------------------------------------------------  IN:    Lactated Ringers: 75 mL    Pantoprazole: 20 mL  Total IN: 95 mL    OUT:  Total OUT: 0 mL    Total NET: 95 mL    <<<<<PHYSICAL EXAM>>>>>  GENERAL: Young woman laying in bed in no apparent distress  HEENT: Mucous membranes moist, oropharynx erythematous, no lesions, no edema, no drooling  Neck: Supple, non-tender  PULMONARY: Clear to auscultation bilaterally. No rales, rhonchi, or wheezing. No stridor  CARDIOVASCULAR: RRR, S1/S2, no M/R/G  GASTROINTESTINAL: Soft, non-distended, TT deep palpation at epigastrum, no guarding  SKIN/EXTREMITIES: Warm, 2+ dorsalis pedis pulses, no edema  NEUROLOGIC/MUSCULOSKELETAL: AOx4, grossly moving all extremities, no focal deficits.    <<<<<LABS>>>>>                        11.1   7.87  )-----------( 203      ( 10 Nov 2023 04:45 )             32.6     11-10    143  |  111<H>  |  6<L>  ----------------------------<  90  3.9   |  24  |  0.6<L>    Ca    8.9      10 Nov 2023 04:45  Mg     1.8     11-10    TPro  5.5<L>  /  Alb  4.0  /  TBili  1.1  /  DBili  x   /  AST  13  /  ALT  8   /  AlkPhos  81  11-10      Urinalysis Basic - ( 10 Nov 2023 04:45 )    Color: x / Appearance: x / SG: x / pH: x  Gluc: 90 mg/dL / Ketone: x  / Bili: x / Urobili: x   Blood: x / Protein: x / Nitrite: x   Leuk Esterase: x / RBC: x / WBC x   Sq Epi: x / Non Sq Epi: x / Bacteria: x        Lactate, Blood: 0.7 mmol/L (11-10-23 @ 04:45)    [id number]      <<<<<LINES/TUBES/DRIPS>>>>>  Peripheral Lines    Pantoprazole gtt    <<<<<RADIOLOGY>>>>>  CTA Neck (11/9): No evidence of active bleed. No evidence for saccular aneurysm, vascular malformation, or large vessel occlusion.    CT CAP IC (11/9): No evidence of acute intrathoracic or abdominal pathology.    CXR (11/9): No radiographic evidence of acute cardiopulmonary disease.    -----------------------------------------------------------------------------------------------------------------------------------------------------------------------------------------------

## 2023-11-11 DIAGNOSIS — F32.9 MAJOR DEPRESSIVE DISORDER, SINGLE EPISODE, UNSPECIFIED: ICD-10-CM

## 2023-11-11 LAB
ALBUMIN SERPL ELPH-MCNC: 3.7 G/DL — SIGNIFICANT CHANGE UP (ref 3.5–5.2)
ALBUMIN SERPL ELPH-MCNC: 3.7 G/DL — SIGNIFICANT CHANGE UP (ref 3.5–5.2)
ALP SERPL-CCNC: 78 U/L — SIGNIFICANT CHANGE UP (ref 30–115)
ALP SERPL-CCNC: 78 U/L — SIGNIFICANT CHANGE UP (ref 30–115)
ALT FLD-CCNC: 10 U/L — SIGNIFICANT CHANGE UP (ref 0–41)
ALT FLD-CCNC: 10 U/L — SIGNIFICANT CHANGE UP (ref 0–41)
ANION GAP SERPL CALC-SCNC: 10 MMOL/L — SIGNIFICANT CHANGE UP (ref 7–14)
ANION GAP SERPL CALC-SCNC: 10 MMOL/L — SIGNIFICANT CHANGE UP (ref 7–14)
AST SERPL-CCNC: 13 U/L — SIGNIFICANT CHANGE UP (ref 0–41)
AST SERPL-CCNC: 13 U/L — SIGNIFICANT CHANGE UP (ref 0–41)
BASOPHILS # BLD AUTO: 0.04 K/UL — SIGNIFICANT CHANGE UP (ref 0–0.2)
BASOPHILS # BLD AUTO: 0.04 K/UL — SIGNIFICANT CHANGE UP (ref 0–0.2)
BASOPHILS NFR BLD AUTO: 0.7 % — SIGNIFICANT CHANGE UP (ref 0–1)
BASOPHILS NFR BLD AUTO: 0.7 % — SIGNIFICANT CHANGE UP (ref 0–1)
BILIRUB SERPL-MCNC: 1.2 MG/DL — SIGNIFICANT CHANGE UP (ref 0.2–1.2)
BILIRUB SERPL-MCNC: 1.2 MG/DL — SIGNIFICANT CHANGE UP (ref 0.2–1.2)
BUN SERPL-MCNC: 6 MG/DL — LOW (ref 10–20)
BUN SERPL-MCNC: 6 MG/DL — LOW (ref 10–20)
CALCIUM SERPL-MCNC: 8.7 MG/DL — SIGNIFICANT CHANGE UP (ref 8.4–10.5)
CALCIUM SERPL-MCNC: 8.7 MG/DL — SIGNIFICANT CHANGE UP (ref 8.4–10.5)
CHLORIDE SERPL-SCNC: 104 MMOL/L — SIGNIFICANT CHANGE UP (ref 98–110)
CHLORIDE SERPL-SCNC: 104 MMOL/L — SIGNIFICANT CHANGE UP (ref 98–110)
CO2 SERPL-SCNC: 26 MMOL/L — SIGNIFICANT CHANGE UP (ref 17–32)
CO2 SERPL-SCNC: 26 MMOL/L — SIGNIFICANT CHANGE UP (ref 17–32)
CREAT SERPL-MCNC: 0.6 MG/DL — LOW (ref 0.7–1.5)
CREAT SERPL-MCNC: 0.6 MG/DL — LOW (ref 0.7–1.5)
EGFR: 130 ML/MIN/1.73M2 — SIGNIFICANT CHANGE UP
EGFR: 130 ML/MIN/1.73M2 — SIGNIFICANT CHANGE UP
EOSINOPHIL # BLD AUTO: 0.13 K/UL — SIGNIFICANT CHANGE UP (ref 0–0.7)
EOSINOPHIL # BLD AUTO: 0.13 K/UL — SIGNIFICANT CHANGE UP (ref 0–0.7)
EOSINOPHIL NFR BLD AUTO: 2.2 % — SIGNIFICANT CHANGE UP (ref 0–8)
EOSINOPHIL NFR BLD AUTO: 2.2 % — SIGNIFICANT CHANGE UP (ref 0–8)
GLUCOSE SERPL-MCNC: 77 MG/DL — SIGNIFICANT CHANGE UP (ref 70–99)
GLUCOSE SERPL-MCNC: 77 MG/DL — SIGNIFICANT CHANGE UP (ref 70–99)
HCT VFR BLD CALC: 32.6 % — LOW (ref 37–47)
HCT VFR BLD CALC: 32.6 % — LOW (ref 37–47)
HGB BLD-MCNC: 11.1 G/DL — LOW (ref 12–16)
HGB BLD-MCNC: 11.1 G/DL — LOW (ref 12–16)
IMM GRANULOCYTES NFR BLD AUTO: 0.2 % — SIGNIFICANT CHANGE UP (ref 0.1–0.3)
IMM GRANULOCYTES NFR BLD AUTO: 0.2 % — SIGNIFICANT CHANGE UP (ref 0.1–0.3)
LYMPHOCYTES # BLD AUTO: 2.33 K/UL — SIGNIFICANT CHANGE UP (ref 1.2–3.4)
LYMPHOCYTES # BLD AUTO: 2.33 K/UL — SIGNIFICANT CHANGE UP (ref 1.2–3.4)
LYMPHOCYTES # BLD AUTO: 40.2 % — SIGNIFICANT CHANGE UP (ref 20.5–51.1)
LYMPHOCYTES # BLD AUTO: 40.2 % — SIGNIFICANT CHANGE UP (ref 20.5–51.1)
MAGNESIUM SERPL-MCNC: 1.9 MG/DL — SIGNIFICANT CHANGE UP (ref 1.8–2.4)
MAGNESIUM SERPL-MCNC: 1.9 MG/DL — SIGNIFICANT CHANGE UP (ref 1.8–2.4)
MCHC RBC-ENTMCNC: 28.9 PG — SIGNIFICANT CHANGE UP (ref 27–31)
MCHC RBC-ENTMCNC: 28.9 PG — SIGNIFICANT CHANGE UP (ref 27–31)
MCHC RBC-ENTMCNC: 34 G/DL — SIGNIFICANT CHANGE UP (ref 32–37)
MCHC RBC-ENTMCNC: 34 G/DL — SIGNIFICANT CHANGE UP (ref 32–37)
MCV RBC AUTO: 84.9 FL — SIGNIFICANT CHANGE UP (ref 81–99)
MCV RBC AUTO: 84.9 FL — SIGNIFICANT CHANGE UP (ref 81–99)
MONOCYTES # BLD AUTO: 0.52 K/UL — SIGNIFICANT CHANGE UP (ref 0.1–0.6)
MONOCYTES # BLD AUTO: 0.52 K/UL — SIGNIFICANT CHANGE UP (ref 0.1–0.6)
MONOCYTES NFR BLD AUTO: 9 % — SIGNIFICANT CHANGE UP (ref 1.7–9.3)
MONOCYTES NFR BLD AUTO: 9 % — SIGNIFICANT CHANGE UP (ref 1.7–9.3)
NEUTROPHILS # BLD AUTO: 2.77 K/UL — SIGNIFICANT CHANGE UP (ref 1.4–6.5)
NEUTROPHILS # BLD AUTO: 2.77 K/UL — SIGNIFICANT CHANGE UP (ref 1.4–6.5)
NEUTROPHILS NFR BLD AUTO: 47.7 % — SIGNIFICANT CHANGE UP (ref 42.2–75.2)
NEUTROPHILS NFR BLD AUTO: 47.7 % — SIGNIFICANT CHANGE UP (ref 42.2–75.2)
NRBC # BLD: 0 /100 WBCS — SIGNIFICANT CHANGE UP (ref 0–0)
NRBC # BLD: 0 /100 WBCS — SIGNIFICANT CHANGE UP (ref 0–0)
PLATELET # BLD AUTO: 199 K/UL — SIGNIFICANT CHANGE UP (ref 130–400)
PLATELET # BLD AUTO: 199 K/UL — SIGNIFICANT CHANGE UP (ref 130–400)
PMV BLD: 12.1 FL — HIGH (ref 7.4–10.4)
PMV BLD: 12.1 FL — HIGH (ref 7.4–10.4)
POTASSIUM SERPL-MCNC: 3.8 MMOL/L — SIGNIFICANT CHANGE UP (ref 3.5–5)
POTASSIUM SERPL-MCNC: 3.8 MMOL/L — SIGNIFICANT CHANGE UP (ref 3.5–5)
POTASSIUM SERPL-SCNC: 3.8 MMOL/L — SIGNIFICANT CHANGE UP (ref 3.5–5)
POTASSIUM SERPL-SCNC: 3.8 MMOL/L — SIGNIFICANT CHANGE UP (ref 3.5–5)
PROT SERPL-MCNC: 5.5 G/DL — LOW (ref 6–8)
PROT SERPL-MCNC: 5.5 G/DL — LOW (ref 6–8)
RBC # BLD: 3.84 M/UL — LOW (ref 4.2–5.4)
RBC # BLD: 3.84 M/UL — LOW (ref 4.2–5.4)
RBC # FLD: 12.3 % — SIGNIFICANT CHANGE UP (ref 11.5–14.5)
RBC # FLD: 12.3 % — SIGNIFICANT CHANGE UP (ref 11.5–14.5)
SODIUM SERPL-SCNC: 140 MMOL/L — SIGNIFICANT CHANGE UP (ref 135–146)
SODIUM SERPL-SCNC: 140 MMOL/L — SIGNIFICANT CHANGE UP (ref 135–146)
WBC # BLD: 5.8 K/UL — SIGNIFICANT CHANGE UP (ref 4.8–10.8)
WBC # BLD: 5.8 K/UL — SIGNIFICANT CHANGE UP (ref 4.8–10.8)
WBC # FLD AUTO: 5.8 K/UL — SIGNIFICANT CHANGE UP (ref 4.8–10.8)
WBC # FLD AUTO: 5.8 K/UL — SIGNIFICANT CHANGE UP (ref 4.8–10.8)

## 2023-11-11 PROCEDURE — 99232 SBSQ HOSP IP/OBS MODERATE 35: CPT

## 2023-11-11 PROCEDURE — 99222 1ST HOSP IP/OBS MODERATE 55: CPT

## 2023-11-11 RX ADMIN — ENOXAPARIN SODIUM 40 MILLIGRAM(S): 100 INJECTION SUBCUTANEOUS at 17:15

## 2023-11-11 RX ADMIN — PANTOPRAZOLE SODIUM 40 MILLIGRAM(S): 20 TABLET, DELAYED RELEASE ORAL at 05:36

## 2023-11-11 RX ADMIN — SODIUM CHLORIDE 75 MILLILITER(S): 9 INJECTION, SOLUTION INTRAVENOUS at 17:14

## 2023-11-11 RX ADMIN — SODIUM CHLORIDE 75 MILLILITER(S): 9 INJECTION, SOLUTION INTRAVENOUS at 02:27

## 2023-11-11 RX ADMIN — PANTOPRAZOLE SODIUM 40 MILLIGRAM(S): 20 TABLET, DELAYED RELEASE ORAL at 17:14

## 2023-11-11 RX ADMIN — CHLORHEXIDINE GLUCONATE 1 APPLICATION(S): 213 SOLUTION TOPICAL at 05:36

## 2023-11-11 NOTE — BH CONSULTATION LIAISON ASSESSMENT NOTE - NSBHCONSULTFOLLOWAFTERCARE_PSY_A_CORE FT
Upon discharge, if agreeable, can refer patient to [facility], located at [address] [place] [address]. Patient can be given [phone] to make appointment themselves. Please ensure that this referral information is included in discharge document.

## 2023-11-11 NOTE — BH CONSULTATION LIAISON ASSESSMENT NOTE - NSBHCHARTREVIEWVS_PSY_A_CORE FT
Vital Signs Last 24 Hrs  T(C): 37.3 (11 Nov 2023 11:49), Max: 37.3 (11 Nov 2023 11:49)  T(F): 99.1 (11 Nov 2023 11:49), Max: 99.1 (11 Nov 2023 11:49)  HR: 50 (11 Nov 2023 11:49) (47 - 66)  BP: 114/62 (11 Nov 2023 11:49) (104/53 - 123/58)  BP(mean): 80 (11 Nov 2023 11:49) (73 - 83)  RR: 18 (11 Nov 2023 11:49) (15 - 22)  SpO2: 98% (11 Nov 2023 11:49) (97% - 99%)    Parameters below as of 11 Nov 2023 11:49  Patient On (Oxygen Delivery Method): room air

## 2023-11-11 NOTE — PROGRESS NOTE ADULT - SUBJECTIVE AND OBJECTIVE BOX
SUBJECTIVE / OVERNIGHT EVENTS  Patient was seen and examined. Patient is on a 1:1. No overnight events. Please is tolerating only clear liquids    MEDICATIONS  chlorhexidine 2% Cloths 1 Application(s) Topical <User Schedule>  enoxaparin Injectable 40 milliGRAM(s) SubCutaneous every 24 hours  lactated ringers. 1000 milliLiter(s) IV Continuous <Continuous>  pantoprazole  Injectable 40 milliGRAM(s) IV Push two times a day      VITALS /  EXAM    T(C): 36.6 (11-11-23 @ 05:37), Max: 37 (11-10-23 @ 13:51)  HR: 55 (11-11-23 @ 05:37) (46 - 66)  BP: 108/55 (11-11-23 @ 05:37) (103/53 - 123/58)  RR: 17 (11-11-23 @ 05:37) (15 - 24)  SpO2: 98% (11-11-23 @ 05:37) (97% - 99%)    GENERAL: AAO*3, not in acute distress. Respond with inappropriate laughing when asked about the events that led her to the ER  CHEST/LUNG: Clear to auscultation bilaterally; No wheezes, rales or rhonchi  HEART: Regular rate and rhythm; No murmurs, rubs, or gallops  ABDOMEN: Soft, Nontender, Nondistended; Bowel sounds present, no masses.  EXTREMITIES:  2+ Peripheral Pulses, No clubbing, cyanosis, or edema    I's & O's     11-10-23 @ 07:01  -  11-11-23 @ 07:00  --------------------------------------------------------  IN:    Lactated Ringers: 600 mL  Total IN: 600 mL    OUT:    Voided (mL): 250 mL  Total OUT: 250 mL    Total NET: 350 mL        LABS             11.1   5.80  )-----------( 199      ( 11-11-23 @ 06:35 )             32.6     140  |  104  |  6   -------------------------<  77   11-11-23 @ 06:35  3.8  |  26  |  0.6    Ca      8.7     11-11-23 @ 06:35  Mg     1.9     11-11-23 @ 06:35    TPro  5.5  /  Alb  3.7  /  TBili  1.2  /  DBili  x   /  AST  13  /  ALT  10  /  AlkPhos  78  /  GGT  x     11-11-23 @ 06:35                    Urinalysis Basic - ( 11 Nov 2023 06:35 )    Color: x / Appearance: x / SG: x / pH: x  Gluc: 77 mg/dL / Ketone: x  / Bili: x / Urobili: x   Blood: x / Protein: x / Nitrite: x   Leuk Esterase: x / RBC: x / WBC x   Sq Epi: x / Non Sq Epi: x / Bacteria: x      MICRO / IMAGING / CARDIOLOGY  Telemetry: Reviewed   EKG: Reviewed    CULTURES    IMAGING  PACS Image:  (11-10-23 @ 00:00)  PACS Image:  (11-09-23 @ 23:59)  PACS Image:  (11-09-23 @ 23:59)  PACS Image:  (11-09-23 @ 21:50)    CARDIOLOGY

## 2023-11-11 NOTE — PROGRESS NOTE ADULT - ASSESSMENT
Patient is a 23y old F w/no pertinent PMH presenting s/p suicide attempt w/5-10 mL bleach ingestion w/pain from throat down her esophagus to stomach w/exacerbation when swallowing and 1 episode of NBNB vomitting, w/ED vitals stable, labs unremarkable, and CXR, CT CAP, and CTA neck unremarkable now admitted to MICU for management of bleach ingestion. Of note, she attempted suicide because her social security was denied 2 days ago and she was stressed as she was a mother of 2 children, one 5 months old and one 3 years old.    # Suicide attempt, no prior attempts  # No hx of underlying depression or post partum psychosis  # Bleach ingestion  *CTA Neck (11/9): No evidence of active bleed. No evidence for saccular aneurysm, vascular malformation, or large vessel occlusion.  *CT CAP IC (11/9): No evidence of acute intrathoracic or abdominal pathology.  *CXR (11/9): No radiographic evidence of acute cardiopulmonary disease.  - Not on ventilation, continue monitoring closely for respiratory distress  - Aspiration precautions  - Toxicology recs (11/10): Opiate positive in the urine  - S/p EGD 11/10: No signs of caustic injury   - ON clear liquid diet  - Off protonix gtt, c/w protonix PO bid  - C/w LR @ 75 cc/hr  - F/u psych eval  - Advance diet as tolerated    #MISC  - DVT PPx: Not indicated  - GI PPx: Protonix drip  - Diet: Clear liquid diet, advance as tolerated

## 2023-11-11 NOTE — BH CONSULTATION LIAISON ASSESSMENT NOTE - CURRENT MEDICATION
MEDICATIONS  (STANDING):  chlorhexidine 2% Cloths 1 Application(s) Topical <User Schedule>  enoxaparin Injectable 40 milliGRAM(s) SubCutaneous every 24 hours  lactated ringers. 1000 milliLiter(s) (75 mL/Hr) IV Continuous <Continuous>  pantoprazole  Injectable 40 milliGRAM(s) IV Push two times a day    MEDICATIONS  (PRN):

## 2023-11-11 NOTE — BH CONSULTATION LIAISON ASSESSMENT NOTE - NSBHCHARTREVIEWLAB_PSY_A_CORE FT
LABS:                          11.1   5.80  )-----------( 199      ( 11 Nov 2023 06:35 )             32.6     11-11    140  |  104  |  6<L>  ----------------------------<  77  3.8   |  26  |  0.6<L>    Ca    8.7      11 Nov 2023 06:35  Mg     1.9     11-11    TPro  5.5<L>  /  Alb  3.7  /  TBili  1.2  /  DBili  x   /  AST  13  /  ALT  10  /  AlkPhos  78  11-11    LIVER FUNCTIONS - ( 11 Nov 2023 06:35 )  Alb: 3.7 g/dL / Pro: 5.5 g/dL / ALK PHOS: 78 U/L / ALT: 10 U/L / AST: 13 U/L / GGT: x             Urinalysis Basic - ( 11 Nov 2023 06:35 )    Color: x / Appearance: x / SG: x / pH: x  Gluc: 77 mg/dL / Ketone: x  / Bili: x / Urobili: x   Blood: x / Protein: x / Nitrite: x   Leuk Esterase: x / RBC: x / WBC x   Sq Epi: x / Non Sq Epi: x / Bacteria: x            11-10-23 @ 15:00  Done

## 2023-11-11 NOTE — BH CONSULTATION LIAISON ASSESSMENT NOTE - RISK ASSESSMENT
Warning signs: isolation, anxiety  Static risk factors:   Modifiable risk factors: impulsivity, recent psychosocial stressors, lack of social support, access to means, unemployment.  Protective factors: social support, family connectiveness, parenthood, seeking out treatment/hopefulness.  Access to lethal: denies  Level of risk: low

## 2023-11-11 NOTE — BH CONSULTATION LIAISON ASSESSMENT NOTE - HPI (INCLUDE ILLNESS QUALITY, SEVERITY, DURATION, TIMING, CONTEXT, MODIFYING FACTORS, ASSOCIATED SIGNS AND SYMPTOMS)
Patient is a 23y old female, Kiswahili-speaking, immigrated from [facility] 1 year ago, , 2 children (sons, 5mn and 2yo) w/no pertinent PMH, no formal psychiatric history (denies prior inpatient admission, diagnoses, psychotropic medication use, SA/NSSIB), denies substance use, presented to the ED after a suicide attempt w/5-10 mL bleach ingestion, psychiatry consulted to evaluate for suicidality.    Upon evaluation, patient is sitting in the hospital bed comfortably, dressed in gown, 1:1 at bedside. Kiswahili  Therese #[id number] was used for duration of interview. Patient that 3 days ago, there was an issue surrounding her and her family's immigration papers, causing immense stress. She explains that "you must try to imagine that I only have my  here. We don't speak the language and now our papers are incorrect." She explains that while her family was sleeping, she has a fleeting thought (lasting seconds) of ending her life, which is when she drank bleach (chose this as it was easily available to her). She says that after ingesting the bleach she struggled to breathe, and proceeded to "run so fast down the street." She then returned home and informed her , who then activated EMS. She emphasizes that she immediately regretted her actions, thinking of children. Patient denies depressed mood, worthlessness, hopelessness, decreased need for sleep, increased energy. She endorses good appetite and sleep. Patient denies any current passive or active suicidal ideation, intent or plan and denies any homicidal ideation. Patient denies any persecutory delusions and denies any auditory or visual hallucinations. Patient is not interested in trying medication at this time, but in interested in therapy.    Collateral was attempted from patient spouse, Thiago Jimenez, at [phone], voicemail left.   Patient is a 23y old female, Yoruba-speaking, immigrated from [facility] 1 year ago, , 2 children (sons, 5mn and 2yo) w/no pertinent PMH, no formal psychiatric history (denies prior inpatient admission, diagnoses, psychotropic medication use, SA/NSSIB), denies substance use, presented to the ED after a suicide attempt w/5-10 mL bleach ingestion, psychiatry consulted to evaluate for suicidality.    Upon evaluation, patient is sitting in the hospital bed comfortably, dressed in gown, 1:1 at bedside. Yoruba  Therese #[id number] was used for duration of interview. Patient that 3 days ago, there was an issue surrounding her and her family's immigration papers, causing immense stress. She explains that "you must try to imagine that I only have my  here. We don't speak the language and now our papers are incorrect." She explains that while her family was sleeping, she has a fleeting thought (lasting seconds) of ending her life, which is when she drank bleach (chose this as it was easily available to her). She says that after ingesting the bleach she struggled to breathe, and proceeded to "run so fast down the street." She then returned home and informed her , who then activated EMS. She emphasizes that she immediately regretted her actions, thinking of children. Patient denies depressed mood, worthlessness, hopelessness, decreased need for sleep, increased energy. She endorses good appetite and sleep. Patient denies any current passive or active suicidal ideation, intent or plan and denies any homicidal ideation. Patient denies any persecutory delusions and denies any auditory or visual hallucinations. Patient is not interested in trying medication at this time, but in interested in therapy.    Collateral was obtained from patient spouse, Thiago Jimenez, at [phone]. He explains that his wife is "fine. Everything is good." He says if any else occurs he will immediately call for help. He adamantly denies any safety concerns. He adamantly denies any prodromal symptoms: endorses good appetite, mood, energy, and behavior. He reinforces that she has had no psychiatric history, no substance use, no suicidal ideations, no odd behavior.

## 2023-11-11 NOTE — BH CONSULTATION LIAISON ASSESSMENT NOTE - NSBHCONSULTRECOMMENDOTHER_PSY_A_CORE FT
#Plan  - Patient able to commit to safety.  adamantly denies any safety concerns.   - Safety Plan discussed with patient and placed in chart  - Upon discharge, if agreeable, can refer patient to [facility], located at [address] [place] [address]. Patient can be given [phone] to make appointment themselves. Please ensure that this referral information is included in discharge document.

## 2023-11-11 NOTE — BH CONSULTATION LIAISON ASSESSMENT NOTE - DIFFERENTIAL
Adjustment disorder vs Major Depressive Disorder  Adjustment disorder vs depressive disorder, not otherwise specified

## 2023-11-11 NOTE — BH CONSULTATION LIAISON ASSESSMENT NOTE - NSBHCHARTREVIEWINVESTIGATE_PSY_A_CORE FT
· EKG Date/Time: 09-Nov-2023 21:37  · Rate: 80  · Interpretation: normal sinus rhythm  · Axis: Normal  · KY: 148  · QRS: 102  · QTC: 440  · ST/T Wave: TWI V1/V2

## 2023-11-11 NOTE — PROGRESS NOTE ADULT - ATTENDING COMMENTS
Patient seen and examined at bedside independently of medical resident and agree with the note unless otherwise stated     Vital Signs Last 24 Hrs  T(C): 37.3 (11 Nov 2023 11:49), Max: 37.3 (11 Nov 2023 11:49)  T(F): 99.1 (11 Nov 2023 11:49), Max: 99.1 (11 Nov 2023 11:49)  HR: 50 (11 Nov 2023 11:49) (47 - 66)  BP: 114/62 (11 Nov 2023 11:49) (103/53 - 123/58)  BP(mean): 80 (11 Nov 2023 11:49) (73 - 83)  RR: 18 (11 Nov 2023 11:49) (15 - 24)  SpO2: 98% (11 Nov 2023 11:49) (97% - 99%)    Parameters below as of 11 Nov 2023 11:49  Patient On (Oxygen Delivery Method): room air                          11.1   5.80  )-----------( 199      ( 11 Nov 2023 06:35 )             32.6   11-11    140  |  104  |  6<L>  ----------------------------<  77  3.8   |  26  |  0.6<L>    Ca    8.7      11 Nov 2023 06:35  Mg     1.9     11-11    TPro  5.5<L>  /  Alb  3.7  /  TBili  1.2  /  DBili  x   /  AST  13  /  ALT  10  /  AlkPhos  78  11-11    # s/p Suicidal Attempt   # r/o Clinical Depression     -my first encounter with patient today - on 1:1 sit - no suicidal ideation to me this am - has two children 3 yrs old and 6 month old (with her  at home) - patient has no additional help/assistance from family - 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 and s/p suicidal attempt     DISPO: not discharge ready until cleared by Psych     Attending Physician Dr. Jeannette Osullivan # [id number]

## 2023-11-11 NOTE — BH CONSULTATION LIAISON ASSESSMENT NOTE - SUMMARY
Patient is a 23y old female, Faroese-speaking, immigrated from [facility] 1 year ago, , 2 children (sons, 5mn and 2yo) w/no pertinent PMH, no formal psychiatric history (denies prior inpatient admission, diagnoses, psychotropic medication use, SA/NSSIB), denies substance use, presented to the ED after a suicide attempt w/5-10 mL bleach ingestion, psychiatry consulted to evaluate for suicidality.    Upon assessment the patient presents as euthymic, and endorses regret over her impulsive suicide attempt. She states that her suicide attempt was in the context of the stress of finding out that there were allegedly new issues with her immigration papers. Most likely she is suffering from an adjustment disorder, given that she does not meet criteria at this time for an acutely decompensated major psychiatric issue. Collateral is needed in order to fully evaluate the patient's safety risk.    #Plan  - Will continue to follow up and reassess  - Left VM for patient's , will reattempt  - Continue 1:1  - Patient requires psychiatric clearance prior to discharge   Patient is a 23y old female, Kiswahili-speaking, immigrated from [facility] 1 year ago, , 2 sons (5mn and 4yo) w/no pertinent PMH, no formal psychiatric history (denies prior inpatient admission, diagnoses, psychotropic medication use, SA/NSSIB), denies substance use, presented to the ED after a suicide attempt w/5-10 mL bleach ingestion, psychiatry consulted to evaluate for suicidality.    Upon assessment the patient presents as euthymic, and endorses regret and guilt over her suicide attempt, which she describes as a singualr fleeting SI that she immediately acted upon; denies planning. She states that her suicide attempt was in the context of the stress of finding out that there were allegedly new issues with her immigration papers. Patient presents as treatment seeking, willing to engage the interviewer, and willing to commit to saftey. Patient denies any current passive or active suicidal ideation, intent or plan and denies any homicidal ideation. Patient denies any persecutory delusions and denies any auditory or visual hallucinations. She has no prior psychiatric history, no substance use, no family history of suicide attempts, and remains calm/cooperative on the floor. She has residential and relationship stability, as well as a supportive partner, values of parenthood, and good insight into her actions. She is most likely she is suffering from adjustment disorder with anxiety, given that she does not meet criteria at this time for an acutely decompensated major psychiatric issue. Collateral obtained from her  is reassuring that her suicide attempt was impulsive, no planning involved, no prior suicidal behavior. Both patient and her  is able to commit to safety, with the intention of following up outpatient with [facility]. Patient does not meet criteria for inpatient psychiatric hospitalization at this time, and does not require constant observation. No psychiatric contraindications to discharge. [facility] psychiatry will sign off on this patient, please feel free to call back with any questions.    #Plan  - Patient able to commit to safety.  adamantly denies any safety concerns.   - Safety Plan discussed with patient and placed in chart  - Upon discharge, if agreeable, can refer patient to [facility], located at [address] [place] [address]. Patient can be given [phone] to make appointment themselves. Please ensure that this referral information is included in discharge document.

## 2023-11-11 NOTE — BH CONSULTATION LIAISON ASSESSMENT NOTE - NSBHATTESTCOMMENTATTENDFT_PSY_A_CORE
Patient evaluated with resident, agree with note and plan.  Disposition is pending collateral from .

## 2023-11-12 ENCOUNTER — TRANSCRIPTION ENCOUNTER (OUTPATIENT)
Age: 23
End: 2023-11-12

## 2023-11-12 VITALS
SYSTOLIC BLOOD PRESSURE: 143 MMHG | HEART RATE: 74 BPM | OXYGEN SATURATION: 98 % | RESPIRATION RATE: 18 BRPM | DIASTOLIC BLOOD PRESSURE: 63 MMHG

## 2023-11-12 LAB
ALBUMIN SERPL ELPH-MCNC: 4.1 G/DL — SIGNIFICANT CHANGE UP (ref 3.5–5.2)
ALBUMIN SERPL ELPH-MCNC: 4.1 G/DL — SIGNIFICANT CHANGE UP (ref 3.5–5.2)
ALP SERPL-CCNC: 86 U/L — SIGNIFICANT CHANGE UP (ref 30–115)
ALP SERPL-CCNC: 86 U/L — SIGNIFICANT CHANGE UP (ref 30–115)
ALT FLD-CCNC: 10 U/L — SIGNIFICANT CHANGE UP (ref 0–41)
ALT FLD-CCNC: 10 U/L — SIGNIFICANT CHANGE UP (ref 0–41)
ANION GAP SERPL CALC-SCNC: 9 MMOL/L — SIGNIFICANT CHANGE UP (ref 7–14)
ANION GAP SERPL CALC-SCNC: 9 MMOL/L — SIGNIFICANT CHANGE UP (ref 7–14)
AST SERPL-CCNC: 12 U/L — SIGNIFICANT CHANGE UP (ref 0–41)
AST SERPL-CCNC: 12 U/L — SIGNIFICANT CHANGE UP (ref 0–41)
BASOPHILS # BLD AUTO: 0.05 K/UL — SIGNIFICANT CHANGE UP (ref 0–0.2)
BASOPHILS # BLD AUTO: 0.05 K/UL — SIGNIFICANT CHANGE UP (ref 0–0.2)
BASOPHILS NFR BLD AUTO: 1.1 % — HIGH (ref 0–1)
BASOPHILS NFR BLD AUTO: 1.1 % — HIGH (ref 0–1)
BILIRUB SERPL-MCNC: 0.8 MG/DL — SIGNIFICANT CHANGE UP (ref 0.2–1.2)
BILIRUB SERPL-MCNC: 0.8 MG/DL — SIGNIFICANT CHANGE UP (ref 0.2–1.2)
BUN SERPL-MCNC: 4 MG/DL — LOW (ref 10–20)
BUN SERPL-MCNC: 4 MG/DL — LOW (ref 10–20)
CALCIUM SERPL-MCNC: 9.3 MG/DL — SIGNIFICANT CHANGE UP (ref 8.4–10.4)
CALCIUM SERPL-MCNC: 9.3 MG/DL — SIGNIFICANT CHANGE UP (ref 8.4–10.4)
CHLORIDE SERPL-SCNC: 106 MMOL/L — SIGNIFICANT CHANGE UP (ref 98–110)
CHLORIDE SERPL-SCNC: 106 MMOL/L — SIGNIFICANT CHANGE UP (ref 98–110)
CO2 SERPL-SCNC: 27 MMOL/L — SIGNIFICANT CHANGE UP (ref 17–32)
CO2 SERPL-SCNC: 27 MMOL/L — SIGNIFICANT CHANGE UP (ref 17–32)
CREAT SERPL-MCNC: 0.5 MG/DL — LOW (ref 0.7–1.5)
CREAT SERPL-MCNC: 0.5 MG/DL — LOW (ref 0.7–1.5)
EGFR: 136 ML/MIN/1.73M2 — SIGNIFICANT CHANGE UP
EGFR: 136 ML/MIN/1.73M2 — SIGNIFICANT CHANGE UP
EOSINOPHIL # BLD AUTO: 0.14 K/UL — SIGNIFICANT CHANGE UP (ref 0–0.7)
EOSINOPHIL # BLD AUTO: 0.14 K/UL — SIGNIFICANT CHANGE UP (ref 0–0.7)
EOSINOPHIL NFR BLD AUTO: 3 % — SIGNIFICANT CHANGE UP (ref 0–8)
EOSINOPHIL NFR BLD AUTO: 3 % — SIGNIFICANT CHANGE UP (ref 0–8)
GLUCOSE SERPL-MCNC: 101 MG/DL — HIGH (ref 70–99)
GLUCOSE SERPL-MCNC: 101 MG/DL — HIGH (ref 70–99)
HCT VFR BLD CALC: 36 % — LOW (ref 37–47)
HCT VFR BLD CALC: 36 % — LOW (ref 37–47)
HGB BLD-MCNC: 12.2 G/DL — SIGNIFICANT CHANGE UP (ref 12–16)
HGB BLD-MCNC: 12.2 G/DL — SIGNIFICANT CHANGE UP (ref 12–16)
IMM GRANULOCYTES NFR BLD AUTO: 0.2 % — SIGNIFICANT CHANGE UP (ref 0.1–0.3)
IMM GRANULOCYTES NFR BLD AUTO: 0.2 % — SIGNIFICANT CHANGE UP (ref 0.1–0.3)
LYMPHOCYTES # BLD AUTO: 1.6 K/UL — SIGNIFICANT CHANGE UP (ref 1.2–3.4)
LYMPHOCYTES # BLD AUTO: 1.6 K/UL — SIGNIFICANT CHANGE UP (ref 1.2–3.4)
LYMPHOCYTES # BLD AUTO: 34.2 % — SIGNIFICANT CHANGE UP (ref 20.5–51.1)
LYMPHOCYTES # BLD AUTO: 34.2 % — SIGNIFICANT CHANGE UP (ref 20.5–51.1)
MAGNESIUM SERPL-MCNC: 1.8 MG/DL — SIGNIFICANT CHANGE UP (ref 1.8–2.4)
MAGNESIUM SERPL-MCNC: 1.8 MG/DL — SIGNIFICANT CHANGE UP (ref 1.8–2.4)
MCHC RBC-ENTMCNC: 28.7 PG — SIGNIFICANT CHANGE UP (ref 27–31)
MCHC RBC-ENTMCNC: 28.7 PG — SIGNIFICANT CHANGE UP (ref 27–31)
MCHC RBC-ENTMCNC: 33.9 G/DL — SIGNIFICANT CHANGE UP (ref 32–37)
MCHC RBC-ENTMCNC: 33.9 G/DL — SIGNIFICANT CHANGE UP (ref 32–37)
MCV RBC AUTO: 84.7 FL — SIGNIFICANT CHANGE UP (ref 81–99)
MCV RBC AUTO: 84.7 FL — SIGNIFICANT CHANGE UP (ref 81–99)
MONOCYTES # BLD AUTO: 0.39 K/UL — SIGNIFICANT CHANGE UP (ref 0.1–0.6)
MONOCYTES # BLD AUTO: 0.39 K/UL — SIGNIFICANT CHANGE UP (ref 0.1–0.6)
MONOCYTES NFR BLD AUTO: 8.3 % — SIGNIFICANT CHANGE UP (ref 1.7–9.3)
MONOCYTES NFR BLD AUTO: 8.3 % — SIGNIFICANT CHANGE UP (ref 1.7–9.3)
NEUTROPHILS # BLD AUTO: 2.49 K/UL — SIGNIFICANT CHANGE UP (ref 1.4–6.5)
NEUTROPHILS # BLD AUTO: 2.49 K/UL — SIGNIFICANT CHANGE UP (ref 1.4–6.5)
NEUTROPHILS NFR BLD AUTO: 53.2 % — SIGNIFICANT CHANGE UP (ref 42.2–75.2)
NEUTROPHILS NFR BLD AUTO: 53.2 % — SIGNIFICANT CHANGE UP (ref 42.2–75.2)
NRBC # BLD: 0 /100 WBCS — SIGNIFICANT CHANGE UP (ref 0–0)
NRBC # BLD: 0 /100 WBCS — SIGNIFICANT CHANGE UP (ref 0–0)
PLATELET # BLD AUTO: 220 K/UL — SIGNIFICANT CHANGE UP (ref 130–400)
PLATELET # BLD AUTO: 220 K/UL — SIGNIFICANT CHANGE UP (ref 130–400)
PMV BLD: 11.9 FL — HIGH (ref 7.4–10.4)
PMV BLD: 11.9 FL — HIGH (ref 7.4–10.4)
POTASSIUM SERPL-MCNC: 4.1 MMOL/L — SIGNIFICANT CHANGE UP (ref 3.5–5)
POTASSIUM SERPL-MCNC: 4.1 MMOL/L — SIGNIFICANT CHANGE UP (ref 3.5–5)
POTASSIUM SERPL-SCNC: 4.1 MMOL/L — SIGNIFICANT CHANGE UP (ref 3.5–5)
POTASSIUM SERPL-SCNC: 4.1 MMOL/L — SIGNIFICANT CHANGE UP (ref 3.5–5)
PROT SERPL-MCNC: 6.1 G/DL — SIGNIFICANT CHANGE UP (ref 6–8)
PROT SERPL-MCNC: 6.1 G/DL — SIGNIFICANT CHANGE UP (ref 6–8)
RBC # BLD: 4.25 M/UL — SIGNIFICANT CHANGE UP (ref 4.2–5.4)
RBC # BLD: 4.25 M/UL — SIGNIFICANT CHANGE UP (ref 4.2–5.4)
RBC # FLD: 12.4 % — SIGNIFICANT CHANGE UP (ref 11.5–14.5)
RBC # FLD: 12.4 % — SIGNIFICANT CHANGE UP (ref 11.5–14.5)
SODIUM SERPL-SCNC: 142 MMOL/L — SIGNIFICANT CHANGE UP (ref 135–146)
SODIUM SERPL-SCNC: 142 MMOL/L — SIGNIFICANT CHANGE UP (ref 135–146)
WBC # BLD: 4.68 K/UL — LOW (ref 4.8–10.8)
WBC # BLD: 4.68 K/UL — LOW (ref 4.8–10.8)
WBC # FLD AUTO: 4.68 K/UL — LOW (ref 4.8–10.8)
WBC # FLD AUTO: 4.68 K/UL — LOW (ref 4.8–10.8)

## 2023-11-12 PROCEDURE — 99239 HOSP IP/OBS DSCHRG MGMT >30: CPT

## 2023-11-12 PROCEDURE — ZZZZZ: CPT

## 2023-11-12 RX ORDER — PANTOPRAZOLE SODIUM 20 MG/1
1 TABLET, DELAYED RELEASE ORAL
Qty: 14 | Refills: 0
Start: 2023-11-12 | End: 2023-11-25

## 2023-11-12 RX ADMIN — CHLORHEXIDINE GLUCONATE 1 APPLICATION(S): 213 SOLUTION TOPICAL at 05:08

## 2023-11-12 RX ADMIN — PANTOPRAZOLE SODIUM 40 MILLIGRAM(S): 20 TABLET, DELAYED RELEASE ORAL at 05:08

## 2023-11-12 NOTE — DISCHARGE NOTE PROVIDER - PROVIDER TOKENS
FREE:[LAST:[[facility], located at [address] [place] [address].  [phone]],PHONE:[(   )    -],FAX:[(   )    -]],PROVIDER:[TOKEN:[[id number]]]

## 2023-11-12 NOTE — CHART NOTE - NSCHARTNOTEFT_GEN_A_CORE
Resident spoke with Dr. Barnes, ED attending who saw patient yesterday for consult. Patient is psychiatrically cleared for discharge. Resident spoke with Dr. Barnes, ED attending who saw patient yesterday for consult. Patient is psychiatrically cleared for discharge.  Addendum: Patient was evaluated and psychiatrically cleared for discharge on 11/11/23.

## 2023-11-12 NOTE — DISCHARGE NOTE NURSING/CASE MANAGEMENT/SOCIAL WORK - PATIENT PORTAL LINK FT
You can access the FollowMyHealth Patient Portal offered by [facility] by registering at the following website: [email or web address]. By joining [facility]’s FollowMyHealth portal, you will also be able to view your health information using other applications (apps) compatible with our system.

## 2023-11-12 NOTE — DISCHARGE NOTE PROVIDER - NSDCMRMEDTOKEN_GEN_ALL_CORE_FT
Prenatal Multivitamins with Folic Acid 1 mg oral tablet: 1 tab(s) orally once a day  Protonix 40 mg oral delayed release tablet: 1 tab(s) orally once a day (at bedtime)

## 2023-11-12 NOTE — DISCHARGE NOTE PROVIDER - ATTENDING DISCHARGE PHYSICAL EXAMINATION:
PHYSICAL EXAM:  GENERAL: Not in distress - smiling - speaking appropriately - no signs of depression   HEAD:  Atraumatic, Normocephalic  EYES: EOMI, PERRLA, conjunctiva and sclera clear  NERVOUS SYSTEM:  Alert & Oriented X 4, Good concentration   CHEST/LUNG: Clear air entry   HEART: Regular rate and rhythm; No murmurs, rubs, or gallops  ABDOMEN: Soft abdomen   EXTREMITIES:  moves all extrem     -pt seen this am   -in pleasant mood and wants to go home to her children/   -outpatient mental health follow up number provided   -case discussed with psych team that assessed the patient yesterday   -patient tolerated her diet - no complaints   -outpatient PMD follow up

## 2023-11-12 NOTE — DISCHARGE NOTE PROVIDER - NSCORESITESY/N_GEN_A_CORE_RD
----- Message from Job Iraheta MD sent at 2/23/2018  2:29 PM CST -----  Please notify patient of recent lab results and/or use the  \"[facility] FP RESULT LETTER\".     Labwork done on 2/20/18, are all satisfactory   No

## 2023-11-12 NOTE — DISCHARGE NOTE PROVIDER - HOSPITAL COURSE
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   -underwent urgent EGD - normal Esophagus, non erosive gastritis stomach   -patient is currently tolerating her diet - no complaints / no nausea/vomiting   -She was also assessed by Psych team yesterday and awaiting their reccs from today for home d/c clearance - once that is done, patient can be discharged home   -I did speak to psych team from yesterday around 12 noon to write an updated note for safe discharge home   -patient was provided with numbers for outpatient mental health follow ups  -case discussed with CM and  (who will assess the patient and do the screening for any needs)  -discharge home only once cleared by Psych    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   -underwent urgent EGD - normal Esophagus, non erosive gastritis stomach   -patient is currently tolerating her diet - no complaints / no nausea/vomiting   -She was also assessed by Psych team yesterday and awaiting their reccs from today for home d/c clearance - once that is done, patient can be discharged home   -I did speak to psych team from yesterday around 12 noon to write an updated note for safe discharge home   -patient was provided with numbers for outpatient mental health follow ups  -case discussed with CM and  (who will assess the patient and do the screening for any needs)      **** psych follow up appreciated and patient is psychiatrically stable for home discharge

## 2023-11-12 NOTE — DISCHARGE NOTE NURSING/CASE MANAGEMENT/SOCIAL WORK - NSDCPEFALRISK_GEN_ALL_CORE
For information on Fall & Injury Prevention, visit: [email or web address] OR  [email or web address] OR  https://www.cdc.gov/steadi/patient.html

## 2023-11-12 NOTE — DISCHARGE NOTE PROVIDER - CARE PROVIDER_API CALL
[facility], located at [address] [place] [address].  [phone],   Phone: (   )    -  Fax: (   )    -  Follow Up Time:     Sherry [address], Admin - Room [address] [place] [address]  Phone: [phone]  Fax: [phone]  Follow Up Time:

## 2023-11-14 LAB
PETH 16:0/18:1: NEGATIVE NG/ML — SIGNIFICANT CHANGE UP
PETH 16:0/18:1: NEGATIVE NG/ML — SIGNIFICANT CHANGE UP
PETH 16:0/18:2: NEGATIVE NG/ML — SIGNIFICANT CHANGE UP
PETH 16:0/18:2: NEGATIVE NG/ML — SIGNIFICANT CHANGE UP
PETH COMMENTS: SIGNIFICANT CHANGE UP
PETH COMMENTS: SIGNIFICANT CHANGE UP
SURGICAL PATHOLOGY STUDY: SIGNIFICANT CHANGE UP
SURGICAL PATHOLOGY STUDY: SIGNIFICANT CHANGE UP

## 2023-11-16 DIAGNOSIS — T54.91XA TOXIC EFFECT OF UNSPECIFIED CORROSIVE SUBSTANCE, ACCIDENTAL (UNINTENTIONAL), INITIAL ENCOUNTER: ICD-10-CM
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
## 2023-11-16 DIAGNOSIS — K29.60 OTHER GASTRITIS WITHOUT BLEEDING: ICD-10-CM

## 2023-11-16 DIAGNOSIS — F43.22 ADJUSTMENT DISORDER WITH ANXIETY: ICD-10-CM

## 2023-11-16 DIAGNOSIS — T54.92XA TOXIC EFFECT OF UNSPECIFIED CORROSIVE SUBSTANCE, INTENTIONAL SELF-HARM, INITIAL ENCOUNTER: ICD-10-CM

## 2023-11-16 LAB
6-ACETYLMORPHINE, UR RESULT: NEGATIVE NG/ML — SIGNIFICANT CHANGE UP
6-ACETYLMORPHINE, UR RESULT: NEGATIVE NG/ML — SIGNIFICANT CHANGE UP
6MAM UR CFM-MCNC: NEGATIVE NG/ML — SIGNIFICANT CHANGE UP
6MAM UR CFM-MCNC: NEGATIVE NG/ML — SIGNIFICANT CHANGE UP
CODEINE UR CFM-MCNC: NEGATIVE NG/ML — SIGNIFICANT CHANGE UP
CODEINE UR CFM-MCNC: NEGATIVE NG/ML — SIGNIFICANT CHANGE UP
CODEINE, UR RESULT: NEGATIVE NG/ML — SIGNIFICANT CHANGE UP
CODEINE, UR RESULT: NEGATIVE NG/ML — SIGNIFICANT CHANGE UP
DIHYDROCODONE RESULT: NEGATIVE NG/ML — SIGNIFICANT CHANGE UP
DIHYDROCODONE RESULT: NEGATIVE NG/ML — SIGNIFICANT CHANGE UP
HYDROCODONE UR QL CFM: NEGATIVE NG/ML — SIGNIFICANT CHANGE UP
HYDROCODONE UR QL CFM: NEGATIVE NG/ML — SIGNIFICANT CHANGE UP
HYDROCODONE, UR RESULT: NEGATIVE NG/ML — SIGNIFICANT CHANGE UP
HYDROCODONE, UR RESULT: NEGATIVE NG/ML — SIGNIFICANT CHANGE UP
HYDROMORPHONE UR QL CFM: NEGATIVE NG/ML — SIGNIFICANT CHANGE UP
HYDROMORPHONE UR QL CFM: NEGATIVE NG/ML — SIGNIFICANT CHANGE UP
HYDROMORPHONE, UR RESULT: NEGATIVE NG/ML — SIGNIFICANT CHANGE UP
HYDROMORPHONE, UR RESULT: NEGATIVE NG/ML — SIGNIFICANT CHANGE UP
MEPERIDINE RESULT: NEGATIVE NG/ML — SIGNIFICANT CHANGE UP
MEPERIDINE RESULT: NEGATIVE NG/ML — SIGNIFICANT CHANGE UP
MORPHINE UR QL CFM: 205 NG/ML — HIGH
MORPHINE UR QL CFM: 205 NG/ML — HIGH
MORPHINE, UR RESULT: 205 NG/ML — HIGH
MORPHINE, UR RESULT: 205 NG/ML — HIGH
NALOXONE RESULT: NEGATIVE NG/ML — SIGNIFICANT CHANGE UP
NALOXONE RESULT: NEGATIVE NG/ML — SIGNIFICANT CHANGE UP
NALOXONE UR CFM-MCNC: NEGATIVE NG/ML — SIGNIFICANT CHANGE UP
NALOXONE UR CFM-MCNC: NEGATIVE NG/ML — SIGNIFICANT CHANGE UP
NALTREXONE RESULT: NEGATIVE NG/ML — SIGNIFICANT CHANGE UP
NALTREXONE RESULT: NEGATIVE NG/ML — SIGNIFICANT CHANGE UP
OPIATES IN-HOUSE INTERPRETATION: POSITIVE
OPIATES IN-HOUSE INTERPRETATION: POSITIVE
OPIATES UR QL CFM: POSITIVE
OPIATES UR QL CFM: POSITIVE
TAPENTADOL RESULT: NEGATIVE NG/ML — SIGNIFICANT CHANGE UP
TAPENTADOL RESULT: NEGATIVE NG/ML — SIGNIFICANT CHANGE UP

## 2023-12-13 NOTE — H&P ADULT - HIV OFFER
EMS from [facility] in [place] after having an allergic reaction after receiving a CT scan with contrast. Had difficulties swallowing. Clinic staff gave epi 0.3, 125 mg solu medrol, and 50 mg Benadryl. Pt on room air, and 100% saturations. BP mildly hypertensive. Pt is Georgian speaking. Pt states swallowing still feels scratchy.  Per nurse, patient's stomach is distended and painful. CT scan normal, but Lipase levels elevated.      Triage Assessment (Adult)       Row Name 12/12/23 3975          Triage Assessment    Airway WDL WDL        Respiratory WDL    Respiratory WDL WDL        Cardiac WDL    Cardiac WDL WDL        Peripheral/Neurovascular WDL    Peripheral Neurovascular WDL WDL        Cognitive/Neuro/Behavioral WDL    Cognitive/Neuro/Behavioral WDL WDL                     
Opt out

## 2023-12-15 ENCOUNTER — APPOINTMENT (OUTPATIENT)
Dept: OBGYN | Facility: CLINIC | Age: 23
End: 2023-12-15
Payer: MEDICAID

## 2023-12-15 ENCOUNTER — OUTPATIENT (OUTPATIENT)
Dept: OUTPATIENT SERVICES | Facility: HOSPITAL | Age: 23
LOS: 1 days | End: 2023-12-15
Payer: MEDICAID

## 2023-12-15 VITALS
BODY MASS INDEX: 20.92 KG/M2 | WEIGHT: 138 LBS | HEIGHT: 68 IN | DIASTOLIC BLOOD PRESSURE: 60 MMHG | SYSTOLIC BLOOD PRESSURE: 100 MMHG

## 2023-12-15 DIAGNOSIS — Z00.00 ENCOUNTER FOR GENERAL ADULT MEDICAL EXAMINATION WITHOUT ABNORMAL FINDINGS: ICD-10-CM

## 2023-12-15 PROCEDURE — T1013: CPT

## 2023-12-15 PROCEDURE — 11981 INSERTION DRUG DLVR IMPLANT: CPT

## 2023-12-15 PROCEDURE — A4562: CPT

## 2023-12-17 NOTE — PLAN
[FreeTextEntry1] : 24 y/o s/p uncomplicated Nexplanon insertion.  - Follow up 1 year for annual or PRN

## 2023-12-18 DIAGNOSIS — Z30.017 ENCOUNTER FOR INITIAL PRESCRIPTION OF IMPLANTABLE SUBDERMAL CONTRACEPTIVE: ICD-10-CM

## 2023-12-20 LAB
HCG UR QL: NEGATIVE
QUALITY CONTROL: YES

## 2024-08-09 ENCOUNTER — APPOINTMENT (OUTPATIENT)
Dept: OBGYN | Facility: CLINIC | Age: 24
End: 2024-08-09

## 2025-07-27 ENCOUNTER — EMERGENCY (EMERGENCY)
Facility: HOSPITAL | Age: 25
LOS: 0 days | Discharge: HOME | End: 2025-07-27
Attending: EMERGENCY MEDICINE
Payer: MEDICAID

## 2025-07-27 VITALS
WEIGHT: 139.99 LBS | RESPIRATION RATE: 16 BRPM | SYSTOLIC BLOOD PRESSURE: 128 MMHG | OXYGEN SATURATION: 100 % | HEART RATE: 101 BPM | HEIGHT: 67 IN | DIASTOLIC BLOOD PRESSURE: 80 MMHG | TEMPERATURE: 99 F

## 2025-07-27 DIAGNOSIS — M25.532 PAIN IN LEFT WRIST: ICD-10-CM

## 2025-07-27 PROCEDURE — 73110 X-RAY EXAM OF WRIST: CPT | Mod: 26,LT

## 2025-07-27 PROCEDURE — 99283 EMERGENCY DEPT VISIT LOW MDM: CPT | Mod: 25

## 2025-07-27 PROCEDURE — 29125 APPL SHORT ARM SPLINT STATIC: CPT | Mod: LT

## 2025-07-27 PROCEDURE — 99284 EMERGENCY DEPT VISIT MOD MDM: CPT | Mod: 25

## 2025-07-27 PROCEDURE — 73110 X-RAY EXAM OF WRIST: CPT | Mod: LT

## 2025-07-27 NOTE — ED PROVIDER NOTE - CLINICAL SUMMARY MEDICAL DECISION MAKING FREE TEXT BOX
VSS.  No distress.  Left wrist pain, chronically, but acutely exacerbated yesterday.  No direct trauma.  NV exam normal.  XRay negative.  Splint for comfort and f/u with Hand.  Strict return instructions discussed.

## 2025-07-27 NOTE — ED PROVIDER NOTE - OBJECTIVE STATEMENT
25-year-old female with no past medical history presented with left wrist pain that worsened last night.  Patient states that she has been having issues with her left wrist over the past 1 to 2 months.  But states that she was doing laundry yesterday and folding close and she hyperextended her wrist to stand and immediately started having pain.  Pain is worse with movement.  No paresthesias or weakness.

## 2025-07-27 NOTE — ED PROVIDER NOTE - PATIENT PORTAL LINK FT
You can access the FollowMyHealth Patient Portal offered by [facility] by registering at the following website: [email or web address]. By joining [employer]’s FollowMyHealth portal, you will also be able to view your health information using other applications (apps) compatible with our system.

## 2025-07-27 NOTE — ED PROVIDER NOTE - NSFOLLOWUPINSTRUCTIONS_ED_ALL_ED_FT
Our Emergency Department Referral Coordinators will be reaching out to you in the next 24-48 hours from 9:00am to 5:00pm to schedule a follow up appointment. Please expect a phone call from the hospital in that time frame. If you do not receive a call or if you have any questions or concerns, you can reach them at   [phone].       Wrist Injury    WHAT YOU NEED TO KNOW:    What is a wrist injury? A wrist injury is damage to the tissues of your wrist joint. Examples are a fracture, sprain (stretched or torn ligament), or strain (stretched or torn tendon).    What are the signs and symptoms of a wrist injury?    Pain, weakness, or numbness in your wrist or hand    A feeling of something clicking, popping, or tearing inside your wrist    A change in the shape of your wrist, hand, or fingers    Trouble moving your wrist or hand  How is a wrist injury diagnosed? Your healthcare provider will check for pain, swelling, or numbness. He or she may check the movement and strength of your wrist. An x-ray, MRI, or CT scan may show if you have broken a bone or other injury. You may be given contrast liquid to help your wrist show up better in the pictures. Tell the healthcare provider if you have ever had an allergic reaction to contrast liquid. Do not enter the MRI room with anything metal. Metal can cause serious injury. Tell the healthcare provider if you have any metal in or on your body.    How is a wrist injury treated? Your treatment depends on the type of wrist injury and amount of tissue damage you have. You may need any of the following:    A wrist support, such as a cast or splint, will help support your wrist and prevent more damage.    NSAIDs help decrease swelling and pain or fever. This medicine is available with or without a doctor's order. NSAIDs can cause stomach bleeding or kidney problems in certain people. If you take blood thinner medicine, always ask your healthcare provider if NSAIDs are safe for you. Always read the medicine label and follow directions.    Prescription pain medicine may be given. Ask your healthcare provider how to take this medicine safely. Some prescription pain medicines contain acetaminophen. Do not take other medicines that contain acetaminophen without talking to your healthcare provider. Too much acetaminophen may cause liver damage. Prescription pain medicine may cause constipation. Ask your healthcare provider how to prevent or treat constipation.    Steroids decrease swelling and pain in your wrist. This may be given as a shot.    Surgery may be used to repair a tear or remove injured and loose tissues. A torn ligament may be repaired or replaced. Screws or wires may be used to attach the bones in your wrist together.  How can I manage my symptoms?    Rest your wrist for 48 hours, or as directed. Avoid activities that cause pain. Ask which activities you should avoid and for how long. Ask when you may return to your regular physical activities or sports. If you start to use your wrist too soon, you may injure it wrist again.    Put ice on your wrist to decrease pain and swelling. Use an ice pack, or put crushed ice in a plastic bag. Wrap a towel around the bag before you put it on your wrist. Apply ice for 15 minutes every hour, or as directed.    Apply compression by wrapping your wrist with an elastic bandage. This will help decrease swelling, support your wrist, and help it heal. Wear your wrist wrap as directed. The bandage should be snug but not so tight that your fingers are numb or tingly.    Elevate your wrist above the level of your heart when you sit or lie down. Prop your arm and hand on pillows to keep your wrist elevated comfortably.    Go to physical therapy, if directed. A physical therapist can teach you exercises to strengthen your wrist and improve the range of movement. These exercises may also help decrease your pain.  How can I prevent a wrist injury?    Do strengthening exercises. Your healthcare provider or physical therapist may suggest that you do exercises to strengthen your hand and arm muscles. He or she will tell you when to start doing these exercises and how long to continue.    Protect your wrists. Wrist guard splints or protective tape can help support your wrist during exercise and sports. These devices may also keep your wrist from bending too far back. Ask for more information about the type of wrist support that you should use.  When should I seek immediate care?    The skin on or near your wrist or hand feels cold or turns blue or white.    The skin on or near your wrist or hand is tight, raised, and swollen.    You have new trouble moving and using your hands, fingers, or wrist.    Your wrist, hands, or fingers become swollen, red, numb, or tingle.    You have any open wounds that are red, swollen, warm, or have pus coming from them.  When should I call my doctor?    You have a fever.    The bruising, swelling, or pain in your wrist gets worse.    You have questions or concerns about your condition or care.  CARE AGREEMENT:    You have the right to help plan your care. Learn about your health condition and how it may be treated. Discuss treatment options with your healthcare providers to decide what care you want to receive. You always have the right to refuse treatment.

## 2025-07-27 NOTE — ED PROVIDER NOTE - DISCHARGE DATE
Dear Ms. Jamie,    You came to [facility] for concerns with blurry vision. You underwent a lumbar puncture which improved your headache and vision slightly. After discussion about Diamox vs.  shunt, risks and benefits were evaluated and a shared decision for  shunt placement was made. This was placed successfully on 9/24. You are being discharged home today, please see instructions below:     Shunt Care:  - Keep incision open to air  - Do not apply creams or lotions  - Do not submerge under water, avoid baths, it is okay to shower.    Medications:  Hydrocortisone:  9/26: Take 20mg (2 tablets) in the morning, take 10mg (1 tablet) at 4PM  9/27: Take 20mg (2 tablets) in the morning, take 10mg (1 tablet) at 4PM  9/28 until your Endocrine appointment (10/10): Take 10mg (1 tablet) in the morning, take 5mg (0.5 tablet) at 4PM    Continue taking all other medications as prescribed.    Follow Ups:  You will follow up with ophthalmology, neurosurgery and neurology.  If you don't already have these appointments scheduled, you will be called by the scheduling service to set up this appointment. If you do not hear from them within 3 days, please call [phone] to schedule the appointment.    Thank you for allowing us to care for you,   Neurology Team    
27-Jul-2025

## 2025-07-27 NOTE — ED PROVIDER NOTE - PHYSICAL EXAMINATION
VITAL SIGNS: I have reviewed nursing notes and confirm.  CONSTITUTIONAL: Patient is in no acute distress.  SKIN: Skin exam is warm and dry, no acute rash.  HEAD: Normocephalic; atraumatic.  ABD: Normal bowel sounds; soft; non-distended; non-tender.   EXT: Mild tenderness to left wrist with painful range of motion of left wrist.  LYMPH: No acute cervical adenopathy.  NEURO: Alert, oriented. Grossly unremarkable. No focal deficits.  PSYCH: Cooperative, appropriate.

## 2025-07-27 NOTE — ED PROVIDER NOTE - IV ALTEPLASE DOOR HIDDEN
Assessment/Plan:    Return in November for annual physical, sooner if needed.     1. Primary hypertension  Stable with current management.  Continue amlodipine at current dose.      Subjective:      Patient ID: Elvi Dwyer is a 41 y.o. female.    41-year-old female presents for follow-up hypertension, currently on amlodipine.  Tolerating medication well, denies adverse side effects.  She does not need a refill at this time for this medication.  She is scheduled for her first mammogram later this month.  She offers no concerns or complaints at this time.        The following portions of the patient's history were reviewed and updated as appropriate: allergies, current medications, past family history, past medical history, past surgical history, and problem list.    Review of Systems   Constitutional: Negative.    HENT: Negative.     Eyes: Negative.    Respiratory: Negative.     Cardiovascular: Negative.    Gastrointestinal: Negative.    Endocrine: Negative.    Genitourinary: Negative.    Musculoskeletal: Negative.    Skin: Negative.    Allergic/Immunologic: Negative.    Neurological: Negative.    Hematological: Negative.    Psychiatric/Behavioral: Negative.           Objective:      /78 (BP Location: Right arm, Patient Position: Sitting, Cuff Size: Large)   Pulse 81   Temp 97.6 °F (36.4 °C) (Temporal)   Wt 88.5 kg (195 lb)   SpO2 100%   BMI 31.47 kg/m²          Physical Exam  Vitals and nursing note reviewed.   Constitutional:       General: She is not in acute distress.     Appearance: Normal appearance. She is obese. She is not ill-appearing.   Eyes:      Conjunctiva/sclera: Conjunctivae normal.      Pupils: Pupils are equal, round, and reactive to light.   Cardiovascular:      Rate and Rhythm: Normal rate and regular rhythm.      Pulses: Normal pulses.      Heart sounds: Normal heart sounds.   Pulmonary:      Effort: Pulmonary effort is normal.      Breath sounds: Normal breath sounds.    Musculoskeletal:      Cervical back: Normal range of motion and neck supple.   Skin:     General: Skin is warm and dry.   Neurological:      General: No focal deficit present.      Mental Status: She is alert and oriented to person, place, and time. Mental status is at baseline.   Psychiatric:         Mood and Affect: Mood normal.         Behavior: Behavior normal.         Thought Content: Thought content normal.                    [facility]   show

## 2025-07-30 NOTE — CHART NOTE - NSCHARTNOTEFT_GEN_A_CORE
[facility] MRN [id number] / Left message 7/28 & 7/30 - KAUSHIK    SPECIALTY: hand surgery
[facility] MRN [id number] / Left message 7/28 & 7/29  - KAUSHIK    SPECIALTY: hand surgery

## 2025-08-20 ENCOUNTER — APPOINTMENT (OUTPATIENT)
Dept: ORTHOPEDIC SURGERY | Facility: CLINIC | Age: 25
End: 2025-08-20